# Patient Record
Sex: MALE | Race: WHITE | URBAN - METROPOLITAN AREA
[De-identification: names, ages, dates, MRNs, and addresses within clinical notes are randomized per-mention and may not be internally consistent; named-entity substitution may affect disease eponyms.]

---

## 2017-12-18 ENCOUNTER — OUTPATIENT (OUTPATIENT)
Dept: OUTPATIENT SERVICES | Facility: HOSPITAL | Age: 77
LOS: 1 days | Discharge: HOME | End: 2017-12-18
Payer: MEDICARE

## 2017-12-18 ENCOUNTER — RESULT REVIEW (OUTPATIENT)
Age: 77
End: 2017-12-18

## 2017-12-18 VITALS
WEIGHT: 175.93 LBS | SYSTOLIC BLOOD PRESSURE: 143 MMHG | HEART RATE: 56 BPM | TEMPERATURE: 98 F | RESPIRATION RATE: 14 BRPM | OXYGEN SATURATION: 100 % | HEIGHT: 70.5 IN | DIASTOLIC BLOOD PRESSURE: 98 MMHG

## 2017-12-18 DIAGNOSIS — Z90.89 ACQUIRED ABSENCE OF OTHER ORGANS: Chronic | ICD-10-CM

## 2017-12-18 DIAGNOSIS — Z98.890 OTHER SPECIFIED POSTPROCEDURAL STATES: Chronic | ICD-10-CM

## 2017-12-18 PROCEDURE — 88313 SPECIAL STAINS GROUP 2: CPT | Mod: 26

## 2017-12-18 PROCEDURE — 88305 TISSUE EXAM BY PATHOLOGIST: CPT | Mod: 26

## 2017-12-19 LAB — SURGICAL PATHOLOGY FINAL REPORT - CH: SIGNIFICANT CHANGE UP

## 2017-12-28 DIAGNOSIS — K22.70 BARRETT'S ESOPHAGUS WITHOUT DYSPLASIA: ICD-10-CM

## 2017-12-28 DIAGNOSIS — Z12.11 ENCOUNTER FOR SCREENING FOR MALIGNANT NEOPLASM OF COLON: ICD-10-CM

## 2017-12-28 DIAGNOSIS — Z95.5 PRESENCE OF CORONARY ANGIOPLASTY IMPLANT AND GRAFT: ICD-10-CM

## 2017-12-28 DIAGNOSIS — Z79.02 LONG TERM (CURRENT) USE OF ANTITHROMBOTICS/ANTIPLATELETS: ICD-10-CM

## 2017-12-28 DIAGNOSIS — Z88.5 ALLERGY STATUS TO NARCOTIC AGENT: ICD-10-CM

## 2017-12-28 DIAGNOSIS — K44.9 DIAPHRAGMATIC HERNIA WITHOUT OBSTRUCTION OR GANGRENE: ICD-10-CM

## 2017-12-28 DIAGNOSIS — Z79.82 LONG TERM (CURRENT) USE OF ASPIRIN: ICD-10-CM

## 2017-12-28 DIAGNOSIS — Z88.1 ALLERGY STATUS TO OTHER ANTIBIOTIC AGENTS STATUS: ICD-10-CM

## 2017-12-28 DIAGNOSIS — E78.5 HYPERLIPIDEMIA, UNSPECIFIED: ICD-10-CM

## 2017-12-28 DIAGNOSIS — D12.5 BENIGN NEOPLASM OF SIGMOID COLON: ICD-10-CM

## 2017-12-28 DIAGNOSIS — K64.8 OTHER HEMORRHOIDS: ICD-10-CM

## 2017-12-28 DIAGNOSIS — I25.10 ATHEROSCLEROTIC HEART DISEASE OF NATIVE CORONARY ARTERY WITHOUT ANGINA PECTORIS: ICD-10-CM

## 2018-09-06 PROBLEM — E78.5 HYPERLIPIDEMIA, UNSPECIFIED: Chronic | Status: ACTIVE | Noted: 2017-12-18

## 2018-09-06 PROBLEM — R12 HEARTBURN: Chronic | Status: ACTIVE | Noted: 2017-12-18

## 2018-09-06 PROBLEM — Z95.5 PRESENCE OF CORONARY ANGIOPLASTY IMPLANT AND GRAFT: Chronic | Status: ACTIVE | Noted: 2017-12-18

## 2018-09-06 PROBLEM — I25.10 ATHEROSCLEROTIC HEART DISEASE OF NATIVE CORONARY ARTERY WITHOUT ANGINA PECTORIS: Chronic | Status: ACTIVE | Noted: 2017-12-18

## 2018-09-06 PROBLEM — D12.6 BENIGN NEOPLASM OF COLON, UNSPECIFIED: Chronic | Status: ACTIVE | Noted: 2017-12-18

## 2018-09-10 ENCOUNTER — OUTPATIENT (OUTPATIENT)
Dept: OUTPATIENT SERVICES | Facility: HOSPITAL | Age: 78
LOS: 1 days | End: 2018-09-10
Payer: MEDICARE

## 2018-09-10 DIAGNOSIS — Z90.89 ACQUIRED ABSENCE OF OTHER ORGANS: Chronic | ICD-10-CM

## 2018-09-10 DIAGNOSIS — Z98.890 OTHER SPECIFIED POSTPROCEDURAL STATES: Chronic | ICD-10-CM

## 2018-09-10 DIAGNOSIS — M54.16 RADICULOPATHY, LUMBAR REGION: ICD-10-CM

## 2018-09-10 PROCEDURE — 62323 NJX INTERLAMINAR LMBR/SAC: CPT

## 2018-09-10 PROCEDURE — 77003 FLUOROGUIDE FOR SPINE INJECT: CPT

## 2019-06-23 ENCOUNTER — INPATIENT (INPATIENT)
Facility: HOSPITAL | Age: 79
LOS: 4 days | Discharge: HOME | End: 2019-06-28
Attending: INTERNAL MEDICINE | Admitting: INTERNAL MEDICINE
Payer: MEDICARE

## 2019-06-23 VITALS
TEMPERATURE: 100 F | DIASTOLIC BLOOD PRESSURE: 85 MMHG | SYSTOLIC BLOOD PRESSURE: 114 MMHG | WEIGHT: 176.37 LBS | OXYGEN SATURATION: 94 % | HEART RATE: 100 BPM | RESPIRATION RATE: 18 BRPM

## 2019-06-23 DIAGNOSIS — Z98.890 OTHER SPECIFIED POSTPROCEDURAL STATES: Chronic | ICD-10-CM

## 2019-06-23 DIAGNOSIS — Z90.89 ACQUIRED ABSENCE OF OTHER ORGANS: Chronic | ICD-10-CM

## 2019-06-23 LAB
BASOPHILS # BLD AUTO: 0.03 K/UL — SIGNIFICANT CHANGE UP (ref 0–0.2)
BASOPHILS NFR BLD AUTO: 0.2 % — SIGNIFICANT CHANGE UP (ref 0–2)
EOSINOPHIL # BLD AUTO: 0.06 K/UL — SIGNIFICANT CHANGE UP (ref 0–0.5)
EOSINOPHIL NFR BLD AUTO: 0.4 % — SIGNIFICANT CHANGE UP (ref 0–6)
HCT VFR BLD CALC: 43.2 % — SIGNIFICANT CHANGE UP (ref 39–50)
HGB BLD-MCNC: 14.7 G/DL — SIGNIFICANT CHANGE UP (ref 13–17)
IMM GRANULOCYTES NFR BLD AUTO: 0.5 % — SIGNIFICANT CHANGE UP (ref 0–1.5)
LYMPHOCYTES # BLD AUTO: 0.89 K/UL — LOW (ref 1–3.3)
LYMPHOCYTES # BLD AUTO: 6.6 % — LOW (ref 13–44)
MCHC RBC-ENTMCNC: 31.5 PG — SIGNIFICANT CHANGE UP (ref 27–34)
MCHC RBC-ENTMCNC: 34 GM/DL — SIGNIFICANT CHANGE UP (ref 32–36)
MCV RBC AUTO: 92.5 FL — SIGNIFICANT CHANGE UP (ref 80–100)
MONOCYTES # BLD AUTO: 1.07 K/UL — HIGH (ref 0–0.9)
MONOCYTES NFR BLD AUTO: 7.9 % — SIGNIFICANT CHANGE UP (ref 2–14)
NEUTROPHILS # BLD AUTO: 11.38 K/UL — HIGH (ref 1.8–7.4)
NEUTROPHILS NFR BLD AUTO: 84.4 % — HIGH (ref 43–77)
PLATELET # BLD AUTO: 201 K/UL — SIGNIFICANT CHANGE UP (ref 150–400)
RBC # BLD: 4.67 M/UL — SIGNIFICANT CHANGE UP (ref 4.2–5.8)
RBC # FLD: 13.8 % — SIGNIFICANT CHANGE UP (ref 10.3–14.5)
WBC # BLD: 13.5 K/UL — HIGH (ref 3.8–10.5)
WBC # FLD AUTO: 13.5 K/UL — HIGH (ref 3.8–10.5)

## 2019-06-23 PROCEDURE — 93010 ELECTROCARDIOGRAM REPORT: CPT

## 2019-06-23 PROCEDURE — 99285 EMERGENCY DEPT VISIT HI MDM: CPT

## 2019-06-23 PROCEDURE — 71045 X-RAY EXAM CHEST 1 VIEW: CPT | Mod: 26

## 2019-06-23 RX ORDER — PIPERACILLIN AND TAZOBACTAM 4; .5 G/20ML; G/20ML
3.38 INJECTION, POWDER, LYOPHILIZED, FOR SOLUTION INTRAVENOUS ONCE
Refills: 0 | Status: COMPLETED | OUTPATIENT
Start: 2019-06-23 | End: 2019-06-23

## 2019-06-23 RX ORDER — VANCOMYCIN HCL 1 G
1250 VIAL (EA) INTRAVENOUS ONCE
Refills: 0 | Status: COMPLETED | OUTPATIENT
Start: 2019-06-23 | End: 2019-06-23

## 2019-06-23 RX ORDER — ACETAMINOPHEN 500 MG
975 TABLET ORAL ONCE
Refills: 0 | Status: COMPLETED | OUTPATIENT
Start: 2019-06-23 | End: 2019-06-23

## 2019-06-23 RX ORDER — SODIUM CHLORIDE 9 MG/ML
2500 INJECTION INTRAMUSCULAR; INTRAVENOUS; SUBCUTANEOUS ONCE
Refills: 0 | Status: COMPLETED | OUTPATIENT
Start: 2019-06-23 | End: 2019-06-23

## 2019-06-23 RX ADMIN — PIPERACILLIN AND TAZOBACTAM 200 GRAM(S): 4; .5 INJECTION, POWDER, LYOPHILIZED, FOR SOLUTION INTRAVENOUS at 23:57

## 2019-06-23 RX ADMIN — SODIUM CHLORIDE 2500 MILLILITER(S): 9 INJECTION INTRAMUSCULAR; INTRAVENOUS; SUBCUTANEOUS at 23:45

## 2019-06-23 RX ADMIN — Medication 975 MILLIGRAM(S): at 23:45

## 2019-06-23 NOTE — ED ADULT NURSE NOTE - OBJECTIVE STATEMENT
pt is 79 y..o male who had one episode of abdominal pain this afternoon than drove home from [facility] and had an episode of epigastric pain which was relieved with nitroglycerin x3 q5m, pt also reports fevers.  pt is s/p carpel tunnel surgery with two wounds looking healthy in nature with a small amount serosanguineous drainage.  pt is tender upon palpation of RUQ, confirmed gallstones s/p u//s out patient x 1 week ago, pt febrile rectally 101.8.

## 2019-06-23 NOTE — ED ADULT NURSE NOTE - PMH
CAD (coronary artery disease)    Colon adenoma    Heartburn    Hyperlipemia    S/P coronary artery stent placement  lad x2

## 2019-06-23 NOTE — ED ADULT NURSE NOTE - BREATH SOUNDS, MLM
Progress Note    Patient: Chago Cottrell Date: 3/20/2018   male, 48 year old  Admit Date: 3/18/2018   Attending: Brendon Garcia MD      Subjective:  Chago Cottrell is a 48 year old male who is being seen in follow up for Colonic mass.    No acute overnight events  Denies abdominal pain, nausea ,or vomiting. No diarrhea  Tmax- 100.2F           Medications: personally reviewed today in this patient's active orders section of epic  Allergies:   Allergies as of 03/18/2018   • (No Known Allergies)       PHYSICAL EXAM:  Visit Vitals  /84 (BP Location: [facility], Patient Position: Semi-Go's)   Pulse 94   Temp 97.9 °F (36.6 °C) (Oral)   Resp 16   Ht 5' 9\" (1.753 m)   Wt 89.9 kg   SpO2 93%   BMI 29.27 kg/m²     General: A & O X 3 in no acute distress, normal cephalic/atraumatic, Mood and Affect appropriate  Lungs: Clear to auscultation bilaterally  Heart:  Regular rate and rhythm and S1, S2 present  Abdomen: NT/ND;  + B.S.; No guarding or rebound; No peritoneal signs  Extremities: cyanosis absent; no obvious lesions or wounds.  Neurologic:  CN 2-12 Grossly intact as best as patient can cooperate with exam     strength is bilaterally intact in all 4 extremities , sensation is grossly intact throughout    Labs:    Recent Labs  Lab 03/20/18  0602 03/19/18  0615 03/18/18  1050   WBC 11.1* 11.9* 18.9*   RBC 2.87* 2.73* 3.11*   HGB 7.9* 7.7* 9.0*   HCT 24.6* 23.3* 26.9*    280 372   SEG 83 84 86       Recent Labs  Lab 03/20/18  0602 03/19/18  0615 03/18/18  1050   SODIUM 138 134* 135   POTASSIUM 3.8 3.7 4.8   CHLORIDE 105 101 100   CO2 23 24 27   BUN 5* 6 8   CREATININE 0.55* 0.60* 0.52*   GFRNA >90 >90 >90   GLUCOSE 129* 112* 105*   CALCIUM 7.6* 7.8* 9.0   ALBUMIN  --  1.4* 1.7*   AST  --  44* 50*   GPT  --  36 38   BILIRUBIN  --  1.1* 1.3*   ALKPT  --  319* 345*   INR  --  1.3 1.2       Assessment & Plan:     · Acute portal vein thrombosis- unknown etiology. c/w heparin probably may start oral AC  tomorrow.. GI following     · Colon thickening with liver metastasis- less concern for colon cancer following colonoscopy. Probably walled off abscess with septic embolies to the liver. s/p EGD/colonoscopy with biopsy and liver FNA.. CEA- < 0.5.   · Blood cxs- NGTD     · Obstructive jaundice: likely due to liver mets possible from septic emboli. Monitor LFTs. s/p FNA of liver  · SIRS- probably due to malignancy or colitis, inflammatory markers are elevated. Lactic acid is normal, pct- elevated. Leucocytosis, c/w zosyn bld cx- NGTD.     · Alcohol dependence- pt has stopped drinking since December last year     · DVT Prophylaxis-SCDs, TEDs and IV Heparin    Code status: Full Resuscitation    Disposition: guarded    Brendon Garcia MD  Hospitalist  3/20/2018  1:15 PM           Clear

## 2019-06-24 DIAGNOSIS — I25.10 ATHEROSCLEROTIC HEART DISEASE OF NATIVE CORONARY ARTERY WITHOUT ANGINA PECTORIS: ICD-10-CM

## 2019-06-24 DIAGNOSIS — J18.1 LOBAR PNEUMONIA, UNSPECIFIED ORGANISM: ICD-10-CM

## 2019-06-24 DIAGNOSIS — R10.13 EPIGASTRIC PAIN: ICD-10-CM

## 2019-06-24 DIAGNOSIS — R07.9 CHEST PAIN, UNSPECIFIED: ICD-10-CM

## 2019-06-24 LAB
ALBUMIN SERPL ELPH-MCNC: 3 G/DL — LOW (ref 3.3–5)
ALBUMIN SERPL ELPH-MCNC: 3.2 G/DL — LOW (ref 3.3–5)
ALP SERPL-CCNC: 68 U/L — SIGNIFICANT CHANGE UP (ref 40–120)
ALP SERPL-CCNC: 70 U/L — SIGNIFICANT CHANGE UP (ref 40–120)
ALT FLD-CCNC: 25 U/L — SIGNIFICANT CHANGE UP (ref 12–78)
ALT FLD-CCNC: 27 U/L — SIGNIFICANT CHANGE UP (ref 12–78)
AMYLASE P1 CFR SERPL: 48 U/L — SIGNIFICANT CHANGE UP (ref 25–115)
ANION GAP SERPL CALC-SCNC: 10 MMOL/L — SIGNIFICANT CHANGE UP (ref 5–17)
ANION GAP SERPL CALC-SCNC: 7 MMOL/L — SIGNIFICANT CHANGE UP (ref 5–17)
APPEARANCE UR: CLEAR — SIGNIFICANT CHANGE UP
APTT BLD: 30.9 SEC — SIGNIFICANT CHANGE UP (ref 27.5–36.3)
AST SERPL-CCNC: 19 U/L — SIGNIFICANT CHANGE UP (ref 15–37)
AST SERPL-CCNC: 20 U/L — SIGNIFICANT CHANGE UP (ref 15–37)
BILIRUB SERPL-MCNC: 0.7 MG/DL — SIGNIFICANT CHANGE UP (ref 0.2–1.2)
BILIRUB SERPL-MCNC: 0.8 MG/DL — SIGNIFICANT CHANGE UP (ref 0.2–1.2)
BILIRUB UR-MCNC: NEGATIVE — SIGNIFICANT CHANGE UP
BUN SERPL-MCNC: 14 MG/DL — SIGNIFICANT CHANGE UP (ref 7–23)
BUN SERPL-MCNC: 18 MG/DL — SIGNIFICANT CHANGE UP (ref 7–23)
CALCIUM SERPL-MCNC: 8.3 MG/DL — LOW (ref 8.5–10.1)
CALCIUM SERPL-MCNC: 8.4 MG/DL — LOW (ref 8.5–10.1)
CHLORIDE SERPL-SCNC: 102 MMOL/L — SIGNIFICANT CHANGE UP (ref 96–108)
CHLORIDE SERPL-SCNC: 107 MMOL/L — SIGNIFICANT CHANGE UP (ref 96–108)
CO2 SERPL-SCNC: 25 MMOL/L — SIGNIFICANT CHANGE UP (ref 22–31)
CO2 SERPL-SCNC: 27 MMOL/L — SIGNIFICANT CHANGE UP (ref 22–31)
COLOR SPEC: YELLOW — SIGNIFICANT CHANGE UP
CREAT SERPL-MCNC: 1.08 MG/DL — SIGNIFICANT CHANGE UP (ref 0.5–1.3)
CREAT SERPL-MCNC: 1.13 MG/DL — SIGNIFICANT CHANGE UP (ref 0.5–1.3)
DIFF PNL FLD: NEGATIVE — SIGNIFICANT CHANGE UP
GLUCOSE SERPL-MCNC: 115 MG/DL — HIGH (ref 70–99)
GLUCOSE SERPL-MCNC: 72 MG/DL — SIGNIFICANT CHANGE UP (ref 70–99)
GLUCOSE UR QL: NEGATIVE MG/DL — SIGNIFICANT CHANGE UP
HCT VFR BLD CALC: 43.8 % — SIGNIFICANT CHANGE UP (ref 39–50)
HGB BLD-MCNC: 14.5 G/DL — SIGNIFICANT CHANGE UP (ref 13–17)
INR BLD: 1.09 RATIO — SIGNIFICANT CHANGE UP (ref 0.88–1.16)
KETONES UR-MCNC: NEGATIVE — SIGNIFICANT CHANGE UP
LACTATE SERPL-SCNC: 0.9 MMOL/L — SIGNIFICANT CHANGE UP (ref 0.7–2)
LEUKOCYTE ESTERASE UR-ACNC: NEGATIVE — SIGNIFICANT CHANGE UP
LIDOCAIN IGE QN: 168 U/L — SIGNIFICANT CHANGE UP (ref 73–393)
MCHC RBC-ENTMCNC: 31.5 PG — SIGNIFICANT CHANGE UP (ref 27–34)
MCHC RBC-ENTMCNC: 33.1 GM/DL — SIGNIFICANT CHANGE UP (ref 32–36)
MCV RBC AUTO: 95 FL — SIGNIFICANT CHANGE UP (ref 80–100)
NITRITE UR-MCNC: NEGATIVE — SIGNIFICANT CHANGE UP
PH UR: 6.5 — SIGNIFICANT CHANGE UP (ref 5–8)
PLATELET # BLD AUTO: 184 K/UL — SIGNIFICANT CHANGE UP (ref 150–400)
POTASSIUM SERPL-MCNC: 4.1 MMOL/L — SIGNIFICANT CHANGE UP (ref 3.5–5.3)
POTASSIUM SERPL-MCNC: 4.2 MMOL/L — SIGNIFICANT CHANGE UP (ref 3.5–5.3)
POTASSIUM SERPL-SCNC: 4.1 MMOL/L — SIGNIFICANT CHANGE UP (ref 3.5–5.3)
POTASSIUM SERPL-SCNC: 4.2 MMOL/L — SIGNIFICANT CHANGE UP (ref 3.5–5.3)
PROT SERPL-MCNC: 6.7 GM/DL — SIGNIFICANT CHANGE UP (ref 6–8.3)
PROT SERPL-MCNC: 6.7 GM/DL — SIGNIFICANT CHANGE UP (ref 6–8.3)
PROT UR-MCNC: NEGATIVE MG/DL — SIGNIFICANT CHANGE UP
PROTHROM AB SERPL-ACNC: 12.1 SEC — SIGNIFICANT CHANGE UP (ref 10–12.9)
RBC # BLD: 4.61 M/UL — SIGNIFICANT CHANGE UP (ref 4.2–5.8)
RBC # FLD: 14.1 % — SIGNIFICANT CHANGE UP (ref 10.3–14.5)
SODIUM SERPL-SCNC: 137 MMOL/L — SIGNIFICANT CHANGE UP (ref 135–145)
SODIUM SERPL-SCNC: 141 MMOL/L — SIGNIFICANT CHANGE UP (ref 135–145)
SP GR SPEC: 1 — LOW (ref 1.01–1.02)
TROPONIN I SERPL-MCNC: 0.12 NG/ML — HIGH (ref 0.01–0.04)
TROPONIN I SERPL-MCNC: 0.16 NG/ML — HIGH (ref 0.01–0.04)
TROPONIN I SERPL-MCNC: 0.17 NG/ML — HIGH (ref 0.01–0.04)
UROBILINOGEN FLD QL: NEGATIVE MG/DL — SIGNIFICANT CHANGE UP
WBC # BLD: 11.2 K/UL — HIGH (ref 3.8–10.5)
WBC # FLD AUTO: 11.2 K/UL — HIGH (ref 3.8–10.5)

## 2019-06-24 PROCEDURE — 71260 CT THORAX DX C+: CPT | Mod: 26

## 2019-06-24 PROCEDURE — 76705 ECHO EXAM OF ABDOMEN: CPT | Mod: 26

## 2019-06-24 PROCEDURE — 74177 CT ABD & PELVIS W/CONTRAST: CPT | Mod: 26

## 2019-06-24 RX ORDER — ASPIRIN/CALCIUM CARB/MAGNESIUM 324 MG
325 TABLET ORAL DAILY
Refills: 0 | Status: DISCONTINUED | OUTPATIENT
Start: 2019-06-24 | End: 2019-06-28

## 2019-06-24 RX ORDER — ONDANSETRON 8 MG/1
4 TABLET, FILM COATED ORAL EVERY 6 HOURS
Refills: 0 | Status: DISCONTINUED | OUTPATIENT
Start: 2019-06-24 | End: 2019-06-28

## 2019-06-24 RX ORDER — LANOLIN ALCOHOL/MO/W.PET/CERES
5 CREAM (GRAM) TOPICAL AT BEDTIME
Refills: 0 | Status: DISCONTINUED | OUTPATIENT
Start: 2019-06-24 | End: 2019-06-28

## 2019-06-24 RX ORDER — PANTOPRAZOLE SODIUM 20 MG/1
40 TABLET, DELAYED RELEASE ORAL
Refills: 0 | Status: DISCONTINUED | OUTPATIENT
Start: 2019-06-24 | End: 2019-06-28

## 2019-06-24 RX ORDER — NITROGLYCERIN 6.5 MG
0.4 CAPSULE, EXTENDED RELEASE ORAL
Refills: 0 | Status: DISCONTINUED | OUTPATIENT
Start: 2019-06-24 | End: 2019-06-28

## 2019-06-24 RX ORDER — MIRTAZAPINE 45 MG/1
15 TABLET, ORALLY DISINTEGRATING ORAL AT BEDTIME
Refills: 0 | Status: DISCONTINUED | OUTPATIENT
Start: 2019-06-24 | End: 2019-06-28

## 2019-06-24 RX ORDER — MIRTAZAPINE 45 MG/1
1 TABLET, ORALLY DISINTEGRATING ORAL
Qty: 0 | Refills: 0 | DISCHARGE

## 2019-06-24 RX ORDER — ATORVASTATIN CALCIUM 80 MG/1
80 TABLET, FILM COATED ORAL AT BEDTIME
Refills: 0 | Status: DISCONTINUED | OUTPATIENT
Start: 2019-06-24 | End: 2019-06-28

## 2019-06-24 RX ORDER — OMEPRAZOLE 10 MG/1
1 CAPSULE, DELAYED RELEASE ORAL
Qty: 0 | Refills: 0 | DISCHARGE

## 2019-06-24 RX ORDER — ACETAMINOPHEN 500 MG
650 TABLET ORAL ONCE
Refills: 0 | Status: DISCONTINUED | OUTPATIENT
Start: 2019-06-24 | End: 2019-06-28

## 2019-06-24 RX ORDER — PIPERACILLIN AND TAZOBACTAM 4; .5 G/20ML; G/20ML
3.38 INJECTION, POWDER, LYOPHILIZED, FOR SOLUTION INTRAVENOUS EVERY 8 HOURS
Refills: 0 | Status: DISCONTINUED | OUTPATIENT
Start: 2019-06-24 | End: 2019-06-27

## 2019-06-24 RX ORDER — OXYBUTYNIN CHLORIDE 5 MG
1 TABLET ORAL
Qty: 0 | Refills: 0 | DISCHARGE

## 2019-06-24 RX ORDER — LANOLIN ALCOHOL/MO/W.PET/CERES
0 CREAM (GRAM) TOPICAL
Qty: 0 | Refills: 0 | DISCHARGE

## 2019-06-24 RX ORDER — ASPIRIN/CALCIUM CARB/MAGNESIUM 324 MG
325 TABLET ORAL ONCE
Refills: 0 | Status: COMPLETED | OUTPATIENT
Start: 2019-06-24 | End: 2019-06-24

## 2019-06-24 RX ORDER — VANCOMYCIN HCL 1 G
1000 VIAL (EA) INTRAVENOUS EVERY 12 HOURS
Refills: 0 | Status: DISCONTINUED | OUTPATIENT
Start: 2019-06-24 | End: 2019-06-27

## 2019-06-24 RX ADMIN — Medication 325 MILLIGRAM(S): at 05:30

## 2019-06-24 RX ADMIN — Medication 250 MILLIGRAM(S): at 20:25

## 2019-06-24 RX ADMIN — Medication 166.67 MILLIGRAM(S): at 01:12

## 2019-06-24 RX ADMIN — Medication 5 MILLIGRAM(S): at 22:13

## 2019-06-24 RX ADMIN — PIPERACILLIN AND TAZOBACTAM 25 GRAM(S): 4; .5 INJECTION, POWDER, LYOPHILIZED, FOR SOLUTION INTRAVENOUS at 22:13

## 2019-06-24 RX ADMIN — PIPERACILLIN AND TAZOBACTAM 3.38 GRAM(S): 4; .5 INJECTION, POWDER, LYOPHILIZED, FOR SOLUTION INTRAVENOUS at 01:01

## 2019-06-24 RX ADMIN — SODIUM CHLORIDE 2500 MILLILITER(S): 9 INJECTION INTRAMUSCULAR; INTRAVENOUS; SUBCUTANEOUS at 05:52

## 2019-06-24 RX ADMIN — Medication 1250 MILLIGRAM(S): at 05:52

## 2019-06-24 RX ADMIN — PIPERACILLIN AND TAZOBACTAM 25 GRAM(S): 4; .5 INJECTION, POWDER, LYOPHILIZED, FOR SOLUTION INTRAVENOUS at 15:47

## 2019-06-24 RX ADMIN — ATORVASTATIN CALCIUM 80 MILLIGRAM(S): 80 TABLET, FILM COATED ORAL at 22:13

## 2019-06-24 RX ADMIN — Medication 975 MILLIGRAM(S): at 01:12

## 2019-06-24 RX ADMIN — Medication 0.4 MILLIGRAM(S): at 12:53

## 2019-06-24 RX ADMIN — MIRTAZAPINE 15 MILLIGRAM(S): 45 TABLET, ORALLY DISINTEGRATING ORAL at 22:45

## 2019-06-24 NOTE — ED PROVIDER NOTE - OBJECTIVE STATEMENT
80 yo male with h/o CAD, HLD, and recent surgery on his right elbow c/o abdominal pain.  Patient has had dull abdominal pain for the day with decreased appetite and some nausea.  Had chills tonight, fever 101.8 in ED.  Patient had chest pain about 2 weeks ago and was sent for US by Dr Mazariegos and found to have gallstones.  No cough.  No URI symptoms.  Dr Leblanc following the arm wound, had some dehiscence at the elbow.

## 2019-06-24 NOTE — H&P ADULT - ASSESSMENT
79 year old man with history of CAD- stents x2 on aspirin, HLD- recent surgery for carpal tunnel on the right hand and Elbow surgery with Dr Leblanc who presented to the ED with complaint of chest pain and abdominal pain. CP has been on and off for the past two weeks, he saw Dr Mazariegos- cardiac workup was done which was "ok" as per patient. He was prescribed Nitro PRN. Yesterday after lunch he started having epigastric abdominal pain and chills, denies fever. He had a recent abdominal sonogram at his PCP office and was told he has  gallstones. In the ED patient had a fever of 101.8 with leukocytosis. Chest CT showed RUL pnuemonia- patient was started on Vanco and zosyn. + elevated troponin- cardiology consulted.

## 2019-06-24 NOTE — H&P ADULT - PROBLEM SELECTOR PLAN 2
HCAP -concern for GNR and other resistant bacteria  on Zosyn and Vanco  ID consult  - monitor tmeps  monitor s/e of antibiotics  f/u blood culture HCAP -concern for GNR and other resistant bacteria  on Zosyn and Vanco-  continue  ID consult pending  - monitor tmeps  monitor s/e of antibiotics  f/u blood culture

## 2019-06-24 NOTE — H&P ADULT - NSHPPHYSICALEXAM_GEN_ALL_CORE
Vital Signs Last 24 Hrs  T(C): 36.2 (24 Jun 2019 09:45), Max: 38.8 (23 Jun 2019 23:45)  T(F): 97.2 (24 Jun 2019 09:45), Max: 101.8 (23 Jun 2019 23:45)  HR: 61 (24 Jun 2019 09:45) (49 - 100)  BP: 122/75 (24 Jun 2019 09:45) (95/65 - 128/74)  BP(mean): --  RR: 18 (24 Jun 2019 09:45) (16 - 20)  SpO2: 99% (24 Jun 2019 09:45) (89% - 99%)    PE:  Constitutional: NAD, laying in bed  HEENT: NC/AT  Back: no tenderness  Respiratory: respirations even and non labored, LCTA  Cardiovascular: S1S2 regular, no murmurs  Abdomen: TTP on epigastric area, distended, hypoactive BS  Genitourinary: voiding  Rectal: deferred  Musculoskeletal: no muscle tenderness, no joint swelling or tenderness  Extremities: Right elbow ace wrap intact. R arm dressign in place  Neurological: no focal deficits

## 2019-06-24 NOTE — PROVIDER CONTACT NOTE (OTHER) - SITUATION
Spoke with Dr. Otero's office. Dr. Pratt will be taking Dr. Otero's consults this week.     Dr. Pratt's office is aware of consult.

## 2019-06-24 NOTE — H&P ADULT - PROBLEM SELECTOR PLAN 4
continue aspirin  cards consult  IMPROVE VTE Individual Risk Assessment  RISK                                                                Points  [  ]Previous VTE                                                  3  [  ] Thrombophilia                                               2  [  ] Lower limb paralysis                                      2        (unable to hold up >15 seconds)    [  ] Current Cancer                                              2         (within 6 months)  [  ] Immobilization > 24 hrs                                1  [  ] ICU/CCU stay > 24 hours                              1    [ x ] Age > 60                                                      1  IMPROVE VTE Score _____1___  IMPROVE Score 0-1: Low Risk, No VTE prophylaxis required for most patients, encourage ambulation.   IMPROVE Score 2-3: At risk, pharmacologic VTE prophylaxis is indicated for most patients (in the absence of a contraindication)  IMPROVE Score > or = 4: High Risk, pharmacologic VTE prophylaxis is indicated for most patients (in the absence of a contraindication)

## 2019-06-24 NOTE — ED PROVIDER NOTE - SKIN, MLM
Skin normal color for race, warm, dry ; sutured lac on right hand, c/d/i; wound to elbow open, with serosanguinous drainage, but no erythema surrounding wound. edges clean.

## 2019-06-24 NOTE — CONSULT NOTE ADULT - ASSESSMENT
79 year old man with history of CAD- stents x2 on aspirin, HLD- recent surgery for carpal tunnel on the right hand and Elbow surgery with Dr Leblanc admitted on 6/23 for evaluation of chest pain and mid epigastric pain which has been present for last 2 weeks; patient took nitroglycerin prior to coming to ED but symptoms persisted; upon admission the patient was febrile to 101.8 and had imaging showing pneumonia. He denies shortness of breath or coughing. No sick contacts or recent travel. No other specific complaints. His mid epigastric pain is worse after eating.   1. Patient admitted with pneumonia which will treat as health care associated pneumonia given recent surgery  - patient at risk for gram negative rods and other resistant bacteria   - iv hydration and supportive care   - serial cbc and monitor temperature   - reviewed prior medical records to evaluate for resistant or atypical pathogens   - agree with zosyn as ordered  - will add vancomycin to treat resistant bacteria   - check vancomycin trough prior to fourth dose   2. other issues; per medicine 79 year old man with history of CAD- stents x2 on aspirin, HLD- recent surgery for carpal tunnel on the right hand and Elbow surgery with Dr Leblanc admitted on 6/23 for evaluation of chest pain and mid epigastric pain which has been present for last 2 weeks; patient took nitroglycerin prior to coming to ED but symptoms persisted; upon admission the patient was febrile to 101.8 and had imaging showing pneumonia. He denies shortness of breath or coughing. No sick contacts or recent travel. No other specific complaints. His mid epigastric pain is worse after eating.   1. Patient admitted with pneumonia which will treat as health care associated pneumonia given recent surgery; also noted with leukocytosis most likely reactive to infection  - patient at risk for gram negative rods and other resistant bacteria   - iv hydration and supportive care   - serial cbc and monitor temperature   - reviewed prior medical records to evaluate for resistant or atypical pathogens   - agree with zosyn as ordered  - will add vancomycin to treat resistant bacteria   - check vancomycin trough prior to fourth dose   2. other issues; per medicine

## 2019-06-24 NOTE — H&P ADULT - ATTENDING COMMENTS
Patient is seen and examined at bedside. Chart reviewed. Agree with above assessment and plan. Patient updated on plan of care and in agreement.

## 2019-06-24 NOTE — PROVIDER CONTACT NOTE (OTHER) - SITUATION
Per Dr. Nuñez contact Dr. Mazariegos for consult - this is his regular patient.  Left message with service

## 2019-06-24 NOTE — CONSULT NOTE ADULT - SUBJECTIVE AND OBJECTIVE BOX
Patient is a 79y old  Male who presents with a chief complaint of abdominal pain and chest pain (2019 09:36)    HPI:  79 year old man with history of CAD- stents x2 on aspirin, HLD- recent surgery for carpal tunnel on the right hand and Elbow surgery with Dr Leblanc admitted on  for evaluation of chest pain and mid epigastric pain which has been present for last 2 weeks; patient took nitroglycerin prior to coming to ED but symptoms persisted; upon admission the patient was febrile to 101.8 and had imaging showing pneumonia. He denies shortness of breath or coughing. No sick contacts or recent travel. No other specific complaints. His mid epigastric pain is worse after eating.            PMH: as above  PSH: as above  Meds: per reconciliation sheet, noted below  MEDICATIONS  (STANDING):  aspirin 325 milliGRAM(s) Oral daily  atorvastatin 80 milliGRAM(s) Oral at bedtime  melatonin 5 milliGRAM(s) Oral at bedtime  mirtazapine 15 milliGRAM(s) Oral at bedtime  pantoprazole    Tablet 40 milliGRAM(s) Oral before breakfast  piperacillin/tazobactam IVPB.. 3.375 Gram(s) IV Intermittent every 8 hours  vancomycin  IVPB 1000 milliGRAM(s) IV Intermittent every 12 hours    MEDICATIONS  (PRN):  nitroglycerin     SubLingual 0.4 milliGRAM(s) SubLingual every 5 minutes PRN Chest Pain  ondansetron Injectable 4 milliGRAM(s) IV Push every 6 hours PRN Nausea    Allergies    clindamycin (Other)  morphine (Other)    Intolerances      Social: no smoking,  alcohol, no illegal drugs; no recent travel, no exposure to TB  FAMILY HISTORY:     no history of premature cardiovascular disease in first degree relatives  ROS: the patient has no chills, no HA, no dizziness, no sore throat, no blurry vision, no palpitations, no SOB, no cough,  no diarrhea, no N/V, no dysuria, no leg pain, no claudication, no rash, no joint aches, no rectal pain or bleeding, no night sweats  All other systems reviewed and are negative    Vital Signs Last 24 Hrs  T(C): 36.8 (2019 12:53), Max: 38.8 (2019 23:45)  T(F): 98.2 (2019 12:53), Max: 101.8 (2019 23:45)  HR: 63 (2019 12:53) (49 - 100)  BP: 132/73 (2019 12:53) (95/65 - 132/73)  BP(mean): --  RR: 18 (2019 12:53) (16 - 20)  SpO2: 98% (2019 12:53) (89% - 99%)  Daily     Daily Weight in k.1 (2019 10:47)    PE:    Constitutional: frail looking  HEENT: NC/AT, EOMI, PERRLA, conjunctivae clear; ears and nose atraumatic; pharynx clear  Neck: supple; thyroid not palpable  Back: no tenderness  Respiratory: respiratory effort normal; clear to auscultation  Cardiovascular: S1S2 regular, no murmurs  Abdomen: soft, not tender, not distended, positive BS; no liver or spleen organomegaly  Genitourinary: no suprapubic tenderness  Musculoskeletal: no muscle tenderness, no joint swelling or tenderness  Neurological/ Psychiatric: AxOx3, judgement and insight normal;  moving all extremities  Skin: no rashes; no palpable lesions    Labs: all available labs reviewed                        14.5   11.20 )-----------( 184      ( 2019 10:53 )             43.8     06-24    141  |  107  |  14  ----------------------------<  72  4.2   |  27  |  1.08    Ca    8.3<L>      2019 10:53    TPro  6.7  /  Alb  3.0<L>  /  TBili  0.8  /  DBili  x   /  AST  20  /  ALT  25  /  AlkPhos  68  06-24     LIVER FUNCTIONS - ( 2019 10:53 )  Alb: 3.0 g/dL / Pro: 6.7 gm/dL / ALK PHOS: 68 U/L / ALT: 25 U/L / AST: 20 U/L / GGT: x           Urinalysis Basic - ( 2019 02:01 )    Color: Yellow / Appearance: Clear / S.005 / pH: x  Gluc: x / Ketone: Negative  / Bili: Negative / Urobili: Negative mg/dL   Blood: x / Protein: Negative mg/dL / Nitrite: Negative   Leuk Esterase: Negative / RBC: x / WBC x   Sq Epi: x / Non Sq Epi: x / Bacteria: x      < from: CT Chest w/ IV Cont (19 @ 02:36) >    EXAM:  CT CHEST IC                          EXAM:  CT ABDOMEN AND PELVIS IC                            PROCEDURE DATE:  2019          INTERPRETATION:  CLINICAL INFORMATION: Fever, hypoxia and abdominal pain.    TECHNIQUE: Contrast-enhanced axial images of the chest, abdomen and   pelvis were obtained with coronal and sagittal reformats. 90 cc Omnipaque   350 were administered intravenously and 10 cc were discarded.    COMPARISON: CT angiogram chest and abdomen 2013.    FINDINGS:   CHEST:  Lungs and airways: The trachea and main bronchi are patent.  Patchy   opacity in the right upper lobe. Dependent bilateral lower lobe   atelectasis. There is no pleural effusion or pneumothorax.     Heart and vessels: The heart size is normal. Coronary artery   calcifications. There is no pericardial effusion. The thoracic aorta and   main pulmonary artery are normal caliber.     Mediastinum and soft tissues: The thyroid gland is unremarkable. There is   no axillary, mediastinal or hilar adenopathy.     Bones: No acute abnormality. Spinal degenerative changes and posterior   thoracolumbar spinal fixation with bilateral transpedicular screws and   interconnecting rods in T12.    ABDOMEN AND PELVIS:  Hepatobiliary: Tiny gallstones in the gallbladder. Mural nodularity at   the fundus consistent with adenomyomatosis. No CT signs of cholecystitis.   No biliary ductal dilation. Liver within normal limits.  Pancreas: Within normal limits.  Spleen: Within normal limits.  Adrenals: Within normal limits.    Kidneys/Ureters/Bladder: Symmetric nephrographic phase of renal   enhancement. Bilateral extrarenal pelves and left renal peripelvic cysts.   No hydronephrosis. Distended urinary bladder with secondary fullness of   the renal collectingsystem bilaterally.  Reproductive Organs: Mild prostatomegaly measuring 5 x 4 x 5.6 cm with   nodular indentation of the bladder base.    Bowel/Peritoneum: No bowel obstruction, inflammation or pneumoperitoneum.    Visualized appendiceal base is normal.  Portions of the gastrointestinal   tract are collapsed, limiting evaluation.    Vessels:  Mild calcific atherosclerosis. No abdominal aortic aneurysm.  Lymphatics/Retroperitoneum: No lymphadenopathy. No retroperitoneal   hematoma.      Soft Tissues: Within normal limits.  Bones: Generalized osteopenia limits evaluation of the lumbar spine.   Transitional lumbosacral anatomy with apparent lumbarized S1. Possible   fusion of the mid lumbar vertebral bodies. Laminectomy and posterior   spinal fusion with bilateral transpedicular screws and interconnecting   rods from T12 to S1. Hardware appears intact without periprosthetic   lucency. Streak artifact from the hardware limits evaluation of the   surrounding structures.  Destructive L5-S1 endplate changes with metallic   intervertebral disc space density, possibly a disc spacer, probably   chronic as presence of vacuum disc change makes acute discitis less   likely.    IMPRESSION:  CT Chest: Right upper lobe opacity which is nonspecific and may reflect   pneumonia or other infiltrate. Follow-up chest CT in 6 weeks recommended   to ensure resolution.    CT Abdomen and Pelvis: Distended urinary bladder with secondary fullness   of the renal collecting system bilaterally. No discernible acute bowel   inflammation.    Additional incidental findings as described in the body of the report.        < end of copied text >      Radiology: all available radiological tests reviewed    Advanced directives addressed: full resuscitation
CHIEF COMPLAINT:    HPI:  79 year old man with history of CAD- stents x2 on aspirin, HLD- recent surgery for carpal tunnel on the right hand and Elbow surgery with Dr Leblanc who presented to the ED with complaint of chest pain and abdominal pain. CP has been on and off for the past two weeks, he saw Dr Mazariegos- cardiac workup was done which was "ok" as per patient. He was prescribed Nitro PRN. Yesterday after lunch he started having epigastric abdominal pain and chills, denies fever. He had a recent abdominal sonogram at his PCP office and was told he has  gallstones. In the ED patient had a fever of 101.8 with leukocytosis. Chest CT showed RUL pnuemonia- patient was started on Vanco and zosyn. + elevated troponin. Pt had a coronary angiogram in Feb 2019 that showed normal coronary arteries and patent stents. He has had chest pain last week but on admission had epigastric pain and on exam epigastric tenderness. EKG showed sinus rhythm with PAC's. Trop elevated x3 but flat. LFT's normal x2. Amylase and lipase normal. Discussed getting HIDA scan with Dr. Pena and NP.    PMH- carpal tunnel syndrome  CAD, HLD  Heartburn  PSH-CAD with stents  Carpal tunnel surgery x2  elbow surgery  back surgery  FMH- heart disease  Father  Stroke- Mother  Social- does not smoke- drinks on occasions (24 Jun 2019 09:36)      PAST MEDICAL & SURGICAL HISTORY:  S/P coronary artery stent placement: lad x2  Hyperlipemia  Heartburn  Colon adenoma  CAD (coronary artery disease)  Status post tonsillectomy  History of repair of right rotator cuff  History of lumbar laminectomy      Allergies    clindamycin (Other)  morphine (Other)    Intolerances        Occupation:  [employer]: Denied  Smoking: Denied  Drug Use: Denied  Marital Status:         FAMILY HISTORY:      REVIEW OF SYSTEMS:    CONSTITUTIONAL: No weakness, fevers or chills  EYES/ENT: No visual changes;  No vertigo or throat pain   NECK: No pain or stiffness  RESPIRATORY: No cough, wheezing, hemoptysis; No shortness of breath  CARDIOVASCULAR: No chest pain or palpitations  GASTROINTESTINAL: No abdominal or epigastric pain. No nausea, vomiting, or hematemesis; No diarrhea or constipation. No melena or hematochezia.  GENITOURINARY: No dysuria, frequency or hematuria  NEUROLOGICAL: No numbness or weakness  SKIN: No itching, burning, rashes, or lesions   All other review of systems is negative unless indicated above    Vital Signs Last 24 Hrs  T(C): 36.8 (24 Jun 2019 12:53), Max: 38.8 (23 Jun 2019 23:45)  T(F): 98.2 (24 Jun 2019 12:53), Max: 101.8 (23 Jun 2019 23:45)  HR: 63 (24 Jun 2019 12:53) (49 - 100)  BP: 132/73 (24 Jun 2019 12:53) (95/65 - 132/73)  BP(mean): --  RR: 18 (24 Jun 2019 12:53) (16 - 20)  SpO2: 98% (24 Jun 2019 12:53) (89% - 99%)    I&O's Summary      PHYSICAL EXAM:    Constitutional: NAD, awake and alert, well-developed  HEENT: PERR, EOMI,  No oral cyananosis.  Neck:  supple,  No JVD  Respiratory: Breath sounds are clear bilaterally, No wheezing, rales or rhonchi  Cardiovascular: S1 and S2, regular rate and rhythm, no Murmurs, gallops or rubs  Gastrointestinal: Bowel Sounds present, soft, nontender.   Extremities: No peripheral edema. No clubbing or cyanosis.  Vascular: 2+ peripheral pulses  Neurological: A/O x 3, no focal deficits  Musculoskeletal: no calf tenderness.  Skin: No rashes.    MEDICATIONS:  MEDICATIONS  (STANDING):  aspirin 325 milliGRAM(s) Oral daily  atorvastatin 80 milliGRAM(s) Oral at bedtime  melatonin 5 milliGRAM(s) Oral at bedtime  mirtazapine 15 milliGRAM(s) Oral at bedtime  pantoprazole    Tablet 40 milliGRAM(s) Oral before breakfast  piperacillin/tazobactam IVPB.. 3.375 Gram(s) IV Intermittent every 8 hours  vancomycin  IVPB 1000 milliGRAM(s) IV Intermittent every 12 hours      LABS: All Labs Reviewed:                        14.5   11.20 )-----------( 184      ( 24 Jun 2019 10:53 )             43.8                         14.7   13.50 )-----------( 201      ( 23 Jun 2019 23:32 )             43.2     24 Jun 2019 10:53    141    |  107    |  14     ----------------------------<  72     4.2     |  27     |  1.08   23 Jun 2019 23:32    137    |  102    |  18     ----------------------------<  115    4.1     |  25     |  1.13     Ca    8.3        24 Jun 2019 10:53  Ca    8.4        23 Jun 2019 23:32    TPro  6.7    /  Alb  3.0    /  TBili  0.8    /  DBili  x      /  AST  20     /  ALT  25     /  AlkPhos  68     24 Jun 2019 10:53  TPro  6.7    /  Alb  3.2    /  TBili  0.7    /  DBili  x      /  AST  19     /  ALT  27     /  AlkPhos  70     23 Jun 2019 23:32    PT/INR - ( 23 Jun 2019 23:32 )   PT: 12.1 sec;   INR: 1.09 ratio         PTT - ( 23 Jun 2019 23:32 )  PTT:30.9 sec  CARDIAC MARKERS ( 24 Jun 2019 10:53 )  0.124 ng/mL / x     / x     / x     / x      CARDIAC MARKERS ( 24 Jun 2019 05:23 )  0.163 ng/mL / x     / x     / x     / x      CARDIAC MARKERS ( 23 Jun 2019 23:32 )  0.168 ng/mL / x     / x     / x     / x          Blood Culture:         RADIOLOGY/EKG:
GI-coverage for Dr. Otero    79 year old man with history of CAD- stents x2 on aspirin, HLD- recent surgery for carpal tunnel on the right hand and Elbow surgery with Dr Leblanc who presented to the ED with complaint of chest pain and abdominal pain. CP has been on and off for the past two weeks. Yesterday prior to admission he noticed a "lump" in the mid abdomen just above the umbilicus, and the degree of mild abdominal tenderness that was nonspecific in location.  He had several episodes of severe sudden onset epigastric pain that would last for a short period of time, and was relieved with nitroglycerin.  He complains of early satiety but not postprandial abdominal pain.  He has a history of GERD and takes PPI medication twice daily as he has for many years.  His last EGD was done about 2 years ago.  On arrival to the ER he was found to be febrile with a temperature of 101.8 Fahrenheit and with a white blood cell count of 13.  He was found on imaging to have a right upper lobe pneumonia and was given antibiotics.  Abdominal sonogram showed gallstones but no blood or wall thickening, a HIDA scan was ordered and is pending.    PAST MEDICAL & SURGICAL HISTORY:  S/P coronary artery stent placement: lad x2  Hyperlipemia  Heartburn  Colon adenoma  CAD (coronary artery disease)  Status post tonsillectomy  History of repair of right rotator cuff  History of lumbar laminectomy    MEDICATIONS  (STANDING):  aspirin 325 milliGRAM(s) Oral daily  atorvastatin 80 milliGRAM(s) Oral at bedtime  melatonin 5 milliGRAM(s) Oral at bedtime  mirtazapine 15 milliGRAM(s) Oral at bedtime  pantoprazole    Tablet 40 milliGRAM(s) Oral before breakfast  piperacillin/tazobactam IVPB.. 3.375 Gram(s) IV Intermittent every 8 hours  vancomycin  IVPB 1000 milliGRAM(s) IV Intermittent every 12 hours    Allergies    clindamycin (Other)  morphine (Other)    FAMILY HISTORY:  NC    Soc: no tob/significant EtOH use      ROS: negative for cough  all systems reviewed and otherwise negative    PHYSICAL EXAM:  Vital Signs Last 24 Hrs  T(C): 36.8 (24 Jun 2019 12:53), Max: 38.8 (23 Jun 2019 23:45)  T(F): 98.2 (24 Jun 2019 12:53), Max: 101.8 (23 Jun 2019 23:45)  HR: 63 (24 Jun 2019 12:53) (49 - 100)  BP: 132/73 (24 Jun 2019 12:53) (95/65 - 132/73)  BP(mean): --  RR: 18 (24 Jun 2019 12:53) (16 - 20)  SpO2: 98% (24 Jun 2019 12:53) (89% - 99%)    Constitutional: No acute distress, alert and oriented ×3  wife present    Eyes: Anicteric    Neck: Supple, no JVD    Respiratory: Clear to auscultation bilaterally, breathing comfortably on NC    Cardiovascular: Regular rate and rhythm, no murmur    Gastrointestinal: Soft. Mild periumbilical tenderness, nondistended, bowel sounds present.  No mass.  No hepatosplenomegaly. No palpable hernia. No RUQ discomfort or Griffiths's sign.    Rectal: Deferred    Extremities: Warm, well-perfused, no edema    Neurological: Grossly intact, no focal deficits    Skin: No lesion or rash    Lymph Nodes: No cervical or supraclavicular lymphadenopathy    Psychiatric: Mood and affect seem normal                          14.5   11.20 )-----------( 184      ( 24 Jun 2019 10:53 )             43.8     06-24    141  |  107  |  14  ----------------------------<  72  4.2   |  27  |  1.08    Ca    8.3<L>      24 Jun 2019 10:53    TPro  6.7  /  Alb  3.0<L>  /  TBili  0.8  /  DBili  x   /  AST  20  /  ALT  25  /  AlkPhos  68  06-24    Imaging reviewed:  CT negative for cholecystitis, +gallstones  sonogram +gallstones    EGD 12/2017 reviewed-small hiatal hernia and irreg Z-line

## 2019-06-24 NOTE — H&P ADULT - HISTORY OF PRESENT ILLNESS
79 year old man with history of CAD- stents x2 on aspirin, HLD- recent surgery for carpal tunnel on the right hand and Elbow surgery with Dr Leblanc who presented to the ED with complaint of chest pain and abdominal pain. CP has been on and off for the past two weeks, he saw Dr Mazariegos- cardiac workup was done which was "ok" as per patient. He was prescribed Nitro PRN. Yesterday after lunch he started having epigastric abdominal pain and chills, denies fever. He had a recent abdominal sonogram at his PCP office and was told he has  gallstones. In the ED patient had a fever of 101.8 with leukocytosis. Chest CT showed RUL pnuemonia- patient was started on Vanco and zosyn. + elevated troponin- cardiology consulted.     PMH- carpal tunnel syndrome  CAD, HLD  Heartburn  PSH-CAD with stents  Carpal tunnel surgery x2  elbow surgery  back surgery  FMH- heart disease  Father  Stroke- Mother  Social- does not smoke- drinks on occasions

## 2019-06-24 NOTE — H&P ADULT - NSHPREVIEWOFSYSTEMS_GEN_ALL_CORE
REVIEW OF SYSTEMS:    CONSTITUTIONAL: No weakness, fevers + chills  EYES/ENT: No visual changes;  No vertigo or throat pain   NECK: No pain or stiffness  RESPIRATORY: No cough, wheezing, hemoptysis; No shortness of breath  CARDIOVASCULAR: + chest pain, nopalpitations  GASTROINTESTINAL: + abdominal or epigastric pain. No nausea, vomiting, or hematemesis; No diarrhea or constipation. No melena or hematochezia.  GENITOURINARY: No dysuria, frequency or hematuria  NEUROLOGICAL: No numbness or weakness  SKIN: No itching, burning, rashes, or lesions   All other review of systems is negative unless indicated above.

## 2019-06-24 NOTE — CONSULT NOTE ADULT - ASSESSMENT
79 year old man with history of CAD- stents x2 on aspirin, HLD- recent surgery for carpal tunnel on the right hand and Elbow surgery with Dr Leblanc who presented to the ED with complaint of chest pain and abdominal pain. CP has been on and off for the past two weeks, he saw Dr Mazariegos- cardiac workup was done which was "ok" as per patient. He was prescribed Nitro PRN. Yesterday after lunch he started having epigastric abdominal pain and chills, denies fever. He had a recent abdominal sonogram at his PCP office and was told he has  gallstones. In the ED patient had a fever of 101.8 with leukocytosis. Chest CT showed RUL pnuemonia- patient was started on Vanco and zosyn. + elevated troponin- cardiology consulted.     Problem/Plan - 1:  ·  Problem: Epigastric pain.  Plan: r/o AC/ gallstone  Pancreatitis  CT abdomen showed tiny gallstones in the gallbladder  Negative murphys sign  check amylase and lipase  check HIDA  Keep NPO after midnight for HIDA  GI consult  recheck Liver panel  CT also showed increase prostate size- patient aware-  will need outpatient f/u with urologist  distended urinary bladder but no hydro on CT- check bladder scan  SOnogram showed - Cholelithiasis without sonographic signs of cholecystitis. Fatty liver and hepatomegaly.     Problem/Plan - 2:  ·  Problem: Pneumonia of right upper lobe due to infectious organism.  Plan: HCAP -concern for GNR and other resistant bacteria  on Zosyn and Vanco-  continue  ID consult pending  - monitor tmeps  monitor s/e of antibiotics  f/u blood culture.     Problem/Plan - 3:  ·  Problem: Chest pain, unspecified type.  Plan: trend troponin  tele monitoring.     Problem/Plan - 4:  ·  Problem: Coronary artery disease without angina pectoris, continue BB and ASA. Angiogram as above. He may need another coronary angiogram.

## 2019-06-24 NOTE — H&P ADULT - PROBLEM SELECTOR PLAN 1
r/o AC/ gallstone  Pancreatitis  CT abdomen showed tiny gallstones in the gallbladder  Negative murphys sign  check amylase and lipase  check HIDA  Keep NPO after midnight for HIDA  GI consult  recheck Liver panel  CT also showed increase prostate size- patient aware-  will need outpatient f/u with urologist  distended urinary bladder but no hydro on CT- check bladder scan  SOnogram showed - Cholelithiasis without sonographic signs of cholecystitis. Fatty liver and hepatomegaly.

## 2019-06-24 NOTE — H&P ADULT - NSHPLABSRESULTS_GEN_ALL_CORE
< from: CT Chest w/ IV Cont (06.24.19 @ 02:36) >    EXAM:  CT CHEST IC                          EXAM:  CT ABDOMEN AND PELVIS IC                            PROCEDURE DATE:  06/24/2019          INTERPRETATION:  CLINICAL INFORMATION: Fever, hypoxia and abdominal pain.    TECHNIQUE: Contrast-enhanced axial images of the chest, abdomen and   pelvis were obtained with coronal and sagittal reformats. 90 cc Omnipaque   350 were administered intravenously and 10 cc were discarded.    COMPARISON: CT angiogram chest and abdomen 5/20/2013.    FINDINGS:   CHEST:  Lungs and airways: The trachea and main bronchi are patent.  Patchy   opacity in the right upper lobe. Dependent bilateral lower lobe   atelectasis. There is no pleural effusion or pneumothorax.     Heart and vessels: The heart size is normal. Coronary artery   calcifications. There is no pericardial effusion. The thoracic aorta and   main pulmonary artery are normal caliber.     Mediastinum and soft tissues: The thyroid gland is unremarkable. There is   no axillary, mediastinal or hilar adenopathy.     Bones: No acute abnormality. Spinal degenerative changes and posterior   thoracolumbar spinal fixation with bilateral transpedicular screws and   interconnecting rods in T12.    ABDOMEN AND PELVIS:  Hepatobiliary: Tiny gallstones in the gallbladder. Mural nodularity at   the fundus consistent with adenomyomatosis. No CT signs of cholecystitis.   No biliary ductal dilation. Liver within normal limits.  Pancreas: Within normal limits.  Spleen: Within normal limits.  Adrenals: Within normal limits.    Kidneys/Ureters/Bladder: Symmetric nephrographic phase of renal   enhancement. Bilateral extrarenal pelves and left renal peripelvic cysts.   No hydronephrosis. Distended urinary bladder with secondary fullness of   the renal collectingsystem bilaterally.  Reproductive Organs: Mild prostatomegaly measuring 5 x 4 x 5.6 cm with   nodular indentation of the bladder base.    Bowel/Peritoneum: No bowel obstruction, inflammation or pneumoperitoneum.    Visualized appendiceal base is normal.  Portions of the gastrointestinal   tract are collapsed, limiting evaluation.    Vessels:  Mild calcific atherosclerosis. No abdominal aortic aneurysm.  Lymphatics/Retroperitoneum: No lymphadenopathy. No retroperitoneal   hematoma.      Soft Tissues: Within normal limits.  Bones: Generalized osteopenia limits evaluation of the lumbar spine.   Transitional lumbosacral anatomy with apparent lumbarized S1. Possible   fusion of the mid lumbar vertebral bodies. Laminectomy and posterior   spinal fusion with bilateral transpedicular screws and interconnecting   rods from T12 to S1. Hardware appears intact without periprosthetic   lucency. Streak artifact from the hardware limits evaluation of the   surrounding structures.  Destructive L5-S1 endplate changes with metallic   intervertebral disc space density, possibly a disc spacer, probably   chronic as presence of vacuum disc change makes acute discitis less   likely.    IMPRESSION:  CT Chest: Right upper lobe opacity which is nonspecific and may reflect   pneumonia or other infiltrate. Follow-up chest CT in 6 weeks recommended   to ensure resolution.    CT Abdomen and Pelvis: Distended urinary bladder with secondary fullness   of the renal collecting system bilaterally. No discernible acute bowel   inflammation.      < end of copied text >

## 2019-06-24 NOTE — ED ADULT NURSE REASSESSMENT NOTE - NS ED NURSE REASSESS COMMENT FT1
Patient A&Ox4, resting in bed. VSS, denies pain/discomfort at this time. No s/s of distress present. Clammy, afebrile; bed linens and gown changed. Wait time explained, hospitalist consult pending. Safety & comfort measures in place, spouse bedside. Will continue to monitor.

## 2019-06-24 NOTE — H&P ADULT - PROBLEM SELECTOR PLAN 3
trend troponin  cardiology consulted  tele monitoring trend troponin  cardiolgoy consulted  tele monitoring

## 2019-06-24 NOTE — CONSULT NOTE ADULT - ASSESSMENT
79M with upper abdominal pain x 2 weeks, on and off, associated with early satiety, relieved with NTG, and with gallstones on imaging but no sign of cholecystitis or choledocholithiasis on imaging or labs.  Rule out functional pain/spasm, gastritis, esophagitis, esophageal spasm, atypical presentation of biliary colic. Doubt retained CBD stone with normal labs. Atypical presentation of RUL PNA as well.    Recommend:  -continue supportive care, abx  -continue PPI  -trend WBC, LFTs   -HIDA scan scheduled tomorrow  -if pain persists and HIDA negative will plan for EGD Weds  -d/w pt and wife in detail  will follow

## 2019-06-24 NOTE — ED ADULT NURSE REASSESSMENT NOTE - NS ED NURSE REASSESS COMMENT FT1
Patient remains as previously assessed. Resting comfortably in bed, no s/s of distress present. Hand-off report to RN Demetria, awaiting transport to . Plan of care discussed. Safety & comfort measures in place, purposeful active rounding on my time.

## 2019-06-25 LAB
ANION GAP SERPL CALC-SCNC: 8 MMOL/L — SIGNIFICANT CHANGE UP (ref 5–17)
BASOPHILS # BLD AUTO: 0.05 K/UL — SIGNIFICANT CHANGE UP (ref 0–0.2)
BASOPHILS NFR BLD AUTO: 0.5 % — SIGNIFICANT CHANGE UP (ref 0–2)
BUN SERPL-MCNC: 14 MG/DL — SIGNIFICANT CHANGE UP (ref 7–23)
CALCIUM SERPL-MCNC: 8.6 MG/DL — SIGNIFICANT CHANGE UP (ref 8.5–10.1)
CHLORIDE SERPL-SCNC: 103 MMOL/L — SIGNIFICANT CHANGE UP (ref 96–108)
CO2 SERPL-SCNC: 24 MMOL/L — SIGNIFICANT CHANGE UP (ref 22–31)
CREAT SERPL-MCNC: 1.1 MG/DL — SIGNIFICANT CHANGE UP (ref 0.5–1.3)
CULTURE RESULTS: NO GROWTH — SIGNIFICANT CHANGE UP
EOSINOPHIL # BLD AUTO: 0.08 K/UL — SIGNIFICANT CHANGE UP (ref 0–0.5)
EOSINOPHIL NFR BLD AUTO: 0.8 % — SIGNIFICANT CHANGE UP (ref 0–6)
GLUCOSE SERPL-MCNC: 127 MG/DL — HIGH (ref 70–99)
HCT VFR BLD CALC: 42.9 % — SIGNIFICANT CHANGE UP (ref 39–50)
HGB BLD-MCNC: 14.3 G/DL — SIGNIFICANT CHANGE UP (ref 13–17)
IMM GRANULOCYTES NFR BLD AUTO: 0.5 % — SIGNIFICANT CHANGE UP (ref 0–1.5)
LYMPHOCYTES # BLD AUTO: 0.91 K/UL — LOW (ref 1–3.3)
LYMPHOCYTES # BLD AUTO: 8.9 % — LOW (ref 13–44)
MCHC RBC-ENTMCNC: 31 PG — SIGNIFICANT CHANGE UP (ref 27–34)
MCHC RBC-ENTMCNC: 33.3 GM/DL — SIGNIFICANT CHANGE UP (ref 32–36)
MCV RBC AUTO: 93.1 FL — SIGNIFICANT CHANGE UP (ref 80–100)
MONOCYTES # BLD AUTO: 0.96 K/UL — HIGH (ref 0–0.9)
MONOCYTES NFR BLD AUTO: 9.4 % — SIGNIFICANT CHANGE UP (ref 2–14)
NEUTROPHILS # BLD AUTO: 8.19 K/UL — HIGH (ref 1.8–7.4)
NEUTROPHILS NFR BLD AUTO: 79.9 % — HIGH (ref 43–77)
PLATELET # BLD AUTO: 176 K/UL — SIGNIFICANT CHANGE UP (ref 150–400)
POTASSIUM SERPL-MCNC: 3.7 MMOL/L — SIGNIFICANT CHANGE UP (ref 3.5–5.3)
POTASSIUM SERPL-SCNC: 3.7 MMOL/L — SIGNIFICANT CHANGE UP (ref 3.5–5.3)
RBC # BLD: 4.61 M/UL — SIGNIFICANT CHANGE UP (ref 4.2–5.8)
RBC # FLD: 13.9 % — SIGNIFICANT CHANGE UP (ref 10.3–14.5)
SODIUM SERPL-SCNC: 135 MMOL/L — SIGNIFICANT CHANGE UP (ref 135–145)
SPECIMEN SOURCE: SIGNIFICANT CHANGE UP
WBC # BLD: 10.24 K/UL — SIGNIFICANT CHANGE UP (ref 3.8–10.5)
WBC # FLD AUTO: 10.24 K/UL — SIGNIFICANT CHANGE UP (ref 3.8–10.5)

## 2019-06-25 PROCEDURE — 78226 HEPATOBILIARY SYSTEM IMAGING: CPT | Mod: 26

## 2019-06-25 RX ADMIN — Medication 5 MILLIGRAM(S): at 20:48

## 2019-06-25 RX ADMIN — PIPERACILLIN AND TAZOBACTAM 25 GRAM(S): 4; .5 INJECTION, POWDER, LYOPHILIZED, FOR SOLUTION INTRAVENOUS at 08:03

## 2019-06-25 RX ADMIN — PANTOPRAZOLE SODIUM 40 MILLIGRAM(S): 20 TABLET, DELAYED RELEASE ORAL at 05:25

## 2019-06-25 RX ADMIN — PIPERACILLIN AND TAZOBACTAM 25 GRAM(S): 4; .5 INJECTION, POWDER, LYOPHILIZED, FOR SOLUTION INTRAVENOUS at 15:16

## 2019-06-25 RX ADMIN — PIPERACILLIN AND TAZOBACTAM 25 GRAM(S): 4; .5 INJECTION, POWDER, LYOPHILIZED, FOR SOLUTION INTRAVENOUS at 22:11

## 2019-06-25 RX ADMIN — Medication 325 MILLIGRAM(S): at 12:29

## 2019-06-25 RX ADMIN — ATORVASTATIN CALCIUM 80 MILLIGRAM(S): 80 TABLET, FILM COATED ORAL at 20:47

## 2019-06-25 RX ADMIN — MIRTAZAPINE 15 MILLIGRAM(S): 45 TABLET, ORALLY DISINTEGRATING ORAL at 20:48

## 2019-06-25 RX ADMIN — Medication 250 MILLIGRAM(S): at 20:32

## 2019-06-25 RX ADMIN — Medication 250 MILLIGRAM(S): at 06:38

## 2019-06-25 NOTE — CDI QUERY NOTE - NSCDIOTHERTXTBX2_GEN_ALL_CORE_FT
Documentation on chart on H&P:  Epigastric pain.  Plan: r/o AC/ gallstone  Pancreatitis  CT abdomen showed tiny gallstones in the gallbladder  Negative murphys sign  check amylase and lipase  check HIDA  Keep NPO after midnight for HIDA  GI consult      GI documented:  79M with upper abdominal pain x 2 weeks, on and off, associated with early satiety, relieved with NTG, and with gallstones on imaging but no sign of cholecystitis or choledocholithiasis on imaging or labs.  Rule out functional pain/spasm, gastritis, esophagitis, esophageal spasm, atypical presentation of biliary colic. Doubt retained CBD stone with normal labs. Atypical presentation of RUL PNA as well.    Please clarify if gallstone Pancreatitis was ruled in or ruled out.

## 2019-06-25 NOTE — CHART NOTE - NSCHARTNOTEFT_GEN_A_CORE
RN notified, pt with temp of 101.1  Pt was admitted for pna. Pt is on vanco and zosyn.   Blood cultures were sent yesterday. Pending results.  Tylenol given

## 2019-06-25 NOTE — PROGRESS NOTE ADULT - SUBJECTIVE AND OBJECTIVE BOX
Patient is a 79y old  Male who presents with a chief complaint of abdominal pain and chest pain (24 Jun 2019 09:36)      Date of service: 06-25-19 @ 12:17      Patient notes persistent mid epigastric pain  No cough and no fever today, but was febrile last evening      ROS: no chills; denies dizziness, no HA, no SOB,  no abdominal pain, no diarrhea or constipation; no dysuria, no urinary frequency, no legs pain, no rashes    MEDICATIONS  (STANDING):  aspirin 325 milliGRAM(s) Oral daily  atorvastatin 80 milliGRAM(s) Oral at bedtime  melatonin 5 milliGRAM(s) Oral at bedtime  mirtazapine 15 milliGRAM(s) Oral at bedtime  pantoprazole    Tablet 40 milliGRAM(s) Oral before breakfast  piperacillin/tazobactam IVPB.. 3.375 Gram(s) IV Intermittent every 8 hours  vancomycin  IVPB 1000 milliGRAM(s) IV Intermittent every 12 hours    MEDICATIONS  (PRN):  acetaminophen   Tablet .. 650 milliGRAM(s) Oral once PRN Temp greater or equal to 38C (100.4F)  nitroglycerin     SubLingual 0.4 milliGRAM(s) SubLingual every 5 minutes PRN Chest Pain  ondansetron Injectable 4 milliGRAM(s) IV Push every 6 hours PRN Nausea      Vital Signs Last 24 Hrs  T(C): 36.7 (25 Jun 2019 11:24), Max: 38.4 (24 Jun 2019 20:10)  T(F): 98.1 (25 Jun 2019 11:24), Max: 101.1 (24 Jun 2019 20:10)  HR: 66 (25 Jun 2019 11:24) (63 - 90)  BP: 118/71 (25 Jun 2019 11:24) (115/67 - 158/77)  BP(mean): --  RR: 17 (25 Jun 2019 11:24) (17 - 18)  SpO2: 95% (25 Jun 2019 11:24) (95% - 99%)    Physical Exam:          Constitutional: frail looking  HEENT: NC/AT, EOMI, PERRLA, conjunctivae clear; ears and nose atraumatic; pharynx clear  Neck: supple; thyroid not palpable  Back: no tenderness  Respiratory: respiratory effort normal; clear to auscultation  Cardiovascular: S1S2 regular, no murmurs  Abdomen: soft, not tender, not distended, positive BS; no liver or spleen organomegaly  Genitourinary: no suprapubic tenderness  Musculoskeletal: no muscle tenderness, no joint swelling or tenderness  Neurological/ Psychiatric: AxOx3, judgement and insight normal;  moving all extremities  Skin: no rashes; no palpable lesions    Labs: all available labs reviewed                       Labs:                        14.3   10.24 )-----------( 176      ( 25 Jun 2019 07:34 )             42.9     06-25    135  |  103  |  14  ----------------------------<  127<H>  3.7   |  24  |  1.10    Ca    8.6      25 Jun 2019 07:34    TPro  6.7  /  Alb  3.0<L>  /  TBili  0.8  /  DBili  x   /  AST  20  /  ALT  25  /  AlkPhos  68  06-24           Cultures:       Culture - Urine (collected 06-24-19 @ 02:01)  Source: .Urine None  Final Report (06-25-19 @ 07:32):    No growth    Culture - Blood (collected 06-23-19 @ 23:32)  Source: .Blood Blood-Peripheral  Preliminary Report (06-25-19 @ 09:01):    No growth to date.    Culture - Blood (collected 06-23-19 @ 23:32)  Source: .Blood Blood-Peripheral  Preliminary Report (06-25-19 @ 09:01):    No growth to date.      < from: NM Hepatobiliary Imaging (06.25.19 @ 11:27) >    EXAM:  NM HEPATOBILIARY IMG                            PROCEDURE DATE:  06/25/2019          INTERPRETATION:    RADIOPHARMACEUTICAL:  99mTc-Mebrofenin  DOSE: 3.4 mCi IV    CLINICAL INFORMATION: 79 year old male with right upper quadrant   abdominal pain and cholelithiasis, referred to evaluate for acute   cholecystitis    TECHNIQUE: Dynamic images of the anterior abdomen were obtained for 90   minutes immediately following radiotracer injection. Static images of the   abdomen in the anterior, right anterior oblique and right lateral   projections were also obtained.    COMPARISON:  No previous hepatobiliary scan for comparison    FINDINGS: There is prompt uptake of the injected radiotracer by the   hepatocytes. The gallbladder is first visualized at 10 minutes post   tracer injection and bowel activity by 65 minutes. There is normal tracer   clearance from the liver by the end of the study.     IMPRESSION: Normal hepatobiliary scan.     No scan evidence of acute cholecystitis.       < end of copied text >      < from: CT Chest w/ IV Cont (06.24.19 @ 02:36) >    EXAM:  CT CHEST IC                          EXAM:  CT ABDOMEN AND PELVIS IC                            PROCEDURE DATE:  06/24/2019          INTERPRETATION:  CLINICAL INFORMATION: Fever, hypoxia and abdominal pain.    TECHNIQUE: Contrast-enhanced axial images of the chest, abdomen and   pelvis were obtained with coronal and sagittal reformats. 90 cc Omnipaque   350 were administered intravenously and 10 cc were discarded.    COMPARISON: CT angiogram chest and abdomen 5/20/2013.    FINDINGS:   CHEST:  Lungs and airways: The trachea and main bronchi are patent.  Patchy   opacity in the right upper lobe. Dependent bilateral lower lobe   atelectasis. There is no pleural effusion or pneumothorax.     Heart and vessels: The heart size is normal. Coronary artery   calcifications. There is no pericardial effusion. The thoracic aorta and   main pulmonary artery are normal caliber.     Mediastinum and soft tissues: The thyroid gland is unremarkable. There is   no axillary, mediastinal or hilar adenopathy.     Bones: No acute abnormality. Spinal degenerative changes and posterior   thoracolumbar spinal fixation with bilateral transpedicular screws and   interconnecting rods in T12.    ABDOMEN AND PELVIS:  Hepatobiliary: Tiny gallstones in the gallbladder. Mural nodularity at   the fundus consistent with adenomyomatosis. No CT signs of cholecystitis.   No biliary ductal dilation. Liver within normal limits.  Pancreas: Within normal limits.  Spleen: Within normal limits.  Adrenals: Within normal limits.    Kidneys/Ureters/Bladder: Symmetric nephrographic phase of renal   enhancement. Bilateral extrarenal pelves and left renal peripelvic cysts.   No hydronephrosis. Distended urinary bladder with secondary fullness of   the renal collectingsystem bilaterally.  Reproductive Organs: Mild prostatomegaly measuring 5 x 4 x 5.6 cm with   nodular indentation of the bladder base.    Bowel/Peritoneum: No bowel obstruction, inflammation or pneumoperitoneum.    Visualized appendiceal base is normal.  Portions of the gastrointestinal   tract are collapsed, limiting evaluation.    Vessels:  Mild calcific atherosclerosis. No abdominal aortic aneurysm.  Lymphatics/Retroperitoneum: No lymphadenopathy. No retroperitoneal   hematoma.      Soft Tissues: Within normal limits.  Bones: Generalized osteopenia limits evaluation of the lumbar spine.   Transitional lumbosacral anatomy with apparent lumbarized S1. Possible   fusion of the mid lumbar vertebral bodies. Laminectomy and posterior   spinal fusion with bilateral transpedicular screws and interconnecting   rods from T12 to S1. Hardware appears intact without periprosthetic   lucency. Streak artifact from the hardware limits evaluation of the   surrounding structures.  Destructive L5-S1 endplate changes with metallic   intervertebral disc space density, possibly a disc spacer, probably   chronic as presence of vacuum disc change makes acute discitis less   likely.    IMPRESSION:  CT Chest: Right upper lobe opacity which is nonspecific and may reflect   pneumonia or other infiltrate. Follow-up chest CT in 6 weeks recommended   to ensure resolution.    CT Abdomen and Pelvis: Distended urinary bladder with secondary fullness   of the renal collecting system bilaterally. No discernible acute bowel   inflammation.    Additional incidental findings as described in the body of the report.        < end of copied text >      Radiology: all available radiological tests reviewed    Advanced directives addressed: full resuscitation

## 2019-06-25 NOTE — PROGRESS NOTE ADULT - SUBJECTIVE AND OBJECTIVE BOX
c/c: epigastric pain    HPI:  79 year old man with history of CAD- stents x2 on aspirin, HLD- recent surgery for carpal tunnel on the right hand and Elbow surgery with Dr Leblanc who presented to the ED with complaint of chest pain and abdominal pain. CP has been on and off for the past two weeks, he saw Dr Mazariegos- cardiac workup was done which was "ok" as per patient. He was prescribed Nitro PRN. , after lunch he started having epigastric abdominal pain and chills, denies fever. He had a recent abdominal sonogram at his PCP office and was told he has  gallstones. In the ED patient had a fever of 101.8 with leukocytosis. Chest CT showed RUL pneumonia- patient was started on Vanco and zosyn. + elevated troponin- cardiology consulted. He was admitted for further management.     : pt seen and examined this am. Was feeling better. States epigastric pain had no relation to food. NO associated n/v. Felt like a burning type of pain.     PMH- carpal tunnel syndrome  CAD, HLD  Heartburn  PSH-CAD with stents  Carpal tunnel surgery x2  elbow surgery  back surgery  FMH- heart disease  Father  Stroke- Mother  Social- does not smoke- drinks on occasions (2019 09:36)      Review of system- All 10 systems reviewed and is as per HPI otherwise negative.           T(C): 36.7 (19 @ 11:24), Max: 38.4 (19 @ 20:10)  HR: 66 (19 @ 11:24) (63 - 90)  BP: 118/71 (19 @ 11:24) (115/67 - 158/77)  RR: 17 (19 @ 11:24) (17 - 17)  SpO2: 95% (19 @ 11:24) (95% - 99%)  Wt(kg): --    PHYSICAL EXAM:    GENERAL: Comfortable, no acute distress  HEAD:  Atraumatic, Normocephalic  EYES: EOMI, PERRLA  HEENT: Moist mucous membranes  NECK: Supple, No JVD  NERVOUS SYSTEM:  Alert & Oriented X3, Motor Strength 5/5 B/L upper and lower extremities  CHEST/LUNG: Clear to auscultation bilaterally  HEART: Regular rate and rhythm; No murmurs, rubs, or gallops  ABDOMEN: Soft, Nontender, Nondistended; Bowel sounds present  GENITOURINARY- Voiding, no palpable bladder  EXTREMITIES:  No clubbing, cyanosis, or edema  MUSCULOSKELTAL- No muscle tenderness, No joint tenderness  SKIN-no rash        LABS:                        14.3   10.24 )-----------( 176      ( 2019 07:34 )             42.9     06    135  |  103  |  14  ----------------------------<  127<H>  3.7   |  24  |  1.10    Ca    8.6      2019 07:34    TPro  6.7  /  Alb  3.0<L>  /  TBili  0.8  /  DBili  x   /  AST  20  /  ALT  25  /  AlkPhos  68  06-24    PT/INR - ( 2019 23:32 )   PT: 12.1 sec;   INR: 1.09 ratio         PTT - ( 2019 23:32 )  PTT:30.9 sec  Urinalysis Basic - ( 2019 02:01 )    Color: Yellow / Appearance: Clear / S.005 / pH: x  Gluc: x / Ketone: Negative  / Bili: Negative / Urobili: Negative mg/dL   Blood: x / Protein: Negative mg/dL / Nitrite: Negative   Leuk Esterase: Negative / RBC: x / WBC x   Sq Epi: x / Non Sq Epi: x / Bacteria: x        CAPILLARY BLOOD GLUCOSE          CAPILLARY BLOOD GLUCOSE        CAPILLARY BLOOD GLUCOSE          CARDIAC MARKERS ( 2019 10:53 )  0.124 ng/mL / x     / x     / x     / x      CARDIAC MARKERS ( 2019 05:23 )  0.163 ng/mL / x     / x     / x     / x      CARDIAC MARKERS ( 2019 23:32 )  0.168 ng/mL / x     / x     / x     / x            MEDS  acetaminophen   Tablet .. 650 milliGRAM(s) Oral once PRN  aspirin 325 milliGRAM(s) Oral daily  atorvastatin 80 milliGRAM(s) Oral at bedtime  melatonin 5 milliGRAM(s) Oral at bedtime  mirtazapine 15 milliGRAM(s) Oral at bedtime  nitroglycerin     SubLingual 0.4 milliGRAM(s) SubLingual every 5 minutes PRN  ondansetron Injectable 4 milliGRAM(s) IV Push every 6 hours PRN  pantoprazole    Tablet 40 milliGRAM(s) Oral before breakfast  piperacillin/tazobactam IVPB.. 3.375 Gram(s) IV Intermittent every 8 hours  vancomycin  IVPB 1000 milliGRAM(s) IV Intermittent every 12 hours c/c: epigastric pain    HPI:  79 year old man with history of CAD- stents x2 on aspirin, HLD- recent surgery for carpal tunnel on the right hand and Elbow surgery with Dr Leblanc who presented to the ED with complaint of chest pain and abdominal pain. CP has been on and off for the past two weeks, he saw Dr Mazariegos- cardiac workup was done which was "ok" as per patient. He was prescribed Nitro PRN. , after lunch he started having epigastric abdominal pain and chills, denies fever. He had a recent abdominal sonogram at his PCP office and was told he has  gallstones. In the ED patient had a fever of 101.8 with leukocytosis. Chest CT showed RUL pneumonia- patient was started on Vanco and zosyn. + elevated troponin- cardiology consulted. He was admitted for further management.     : pt seen and examined this am. Was feeling better. States epigastric pain had no relation to food. NO associated n/v. Felt like a burning type of pain.         Review of system- All 10 systems reviewed and is as per HPI otherwise negative.     VITALS  T(C): 36.7 (19 @ 11:24), Max: 38.4 (19 @ 20:10)  HR: 66 (19 @ 11:24) (63 - 90)  BP: 118/71 (19 @ 11:24) (115/67 - 158/77)  RR: 17 (19 @ 11:24) (17 - 17)  SpO2: 95% (19 @ 11:24) (95% - 99%)      PHYSICAL EXAM:    GENERAL: Comfortable, no acute distress  HEAD:  Atraumatic, Normocephalic  EYES: EOMI, PERRLA  HEENT: Moist mucous membranes  NECK: Supple, No JVD  NERVOUS SYSTEM:  Alert & Oriented X3, Motor Strength 5/5 B/L upper and lower extremities  CHEST/LUNG: Clear to auscultation bilaterally  HEART: Regular rate and rhythm; No murmurs, rubs, or gallops  ABDOMEN: Soft, Nontender, Nondistended; Bowel sounds present  GENITOURINARY- Voiding, no palpable bladder  EXTREMITIES:  No clubbing, cyanosis, or edema  MUSCULOSKELTAL- No muscle tenderness, No joint tenderness  SKIN-no rash        LABS:                        14.3   10.24 )-----------( 176      ( 2019 07:34 )             42.9     06    135  |  103  |  14  ----------------------------<  127<H>  3.7   |  24  |  1.10    Ca    8.6      2019 07:34    TPro  6.7  /  Alb  3.0<L>  /  TBili  0.8  /  DBili  x   /  AST  20  /  ALT  25  /  AlkPhos  68  06-24    PT/INR - ( 2019 23:32 )   PT: 12.1 sec;   INR: 1.09 ratio         PTT - ( 2019 23:32 )  PTT:30.9 sec  Urinalysis Basic - ( 2019 02:01 )    Color: Yellow / Appearance: Clear / S.005 / pH: x  Gluc: x / Ketone: Negative  / Bili: Negative / Urobili: Negative mg/dL   Blood: x / Protein: Negative mg/dL / Nitrite: Negative   Leuk Esterase: Negative / RBC: x / WBC x   Sq Epi: x / Non Sq Epi: x / Bacteria: x        CARDIAC MARKERS ( 2019 10:53 )  0.124 ng/mL / x     / x     / x     / x      CARDIAC MARKERS ( 2019 05:23 )  0.163 ng/mL / x     / x     / x     / x      CARDIAC MARKERS ( 2019 23:32 )  0.168 ng/mL / x     / x     / x     / x            MEDS  acetaminophen   Tablet .. 650 milliGRAM(s) Oral once PRN  aspirin 325 milliGRAM(s) Oral daily  atorvastatin 80 milliGRAM(s) Oral at bedtime  melatonin 5 milliGRAM(s) Oral at bedtime  mirtazapine 15 milliGRAM(s) Oral at bedtime  nitroglycerin     SubLingual 0.4 milliGRAM(s) SubLingual every 5 minutes PRN  ondansetron Injectable 4 milliGRAM(s) IV Push every 6 hours PRN  pantoprazole    Tablet 40 milliGRAM(s) Oral before breakfast  piperacillin/tazobactam IVPB.. 3.375 Gram(s) IV Intermittent every 8 hours  vancomycin  IVPB 1000 milliGRAM(s) IV Intermittent every 12 hours    ASSESSMENT AND PLAN:  79m, PMH as above a/w:    1. Pneumonia/HCAP with concern for resistant gnr and/or gram positive organism.   -ID eval appreciated.   -continue iv abx.  -f/u cultures.  -NO SEPSIS.     2.  Epigastric pain:  -?related to gastritis.   - ppi  - gallstone pancreatitis ruled out  - HIDA negative.   - GI eval noted-->EGD    3. h/o CAD with elevated troponins/intermittent chest pain:  -cardio eval appreciated, may need angiogram for further evaluation.     4. HLD:  -statin.     5. DVT px  -ambulate

## 2019-06-25 NOTE — PROGRESS NOTE ADULT - SUBJECTIVE AND OBJECTIVE BOX
GI-coverage for Dr. Otero    still with dull epigastric discomfort and tenderness  no severe pain  no n/v  HIDA scan negative    ROS: negative for cough  all systems reviewed and otherwise negative    PHYSICAL EXAM:  Vital Signs Last 24 Hrs  T(C): 36.7 (25 Jun 2019 11:24), Max: 38.4 (24 Jun 2019 20:10)  T(F): 98.1 (25 Jun 2019 11:24), Max: 101.1 (24 Jun 2019 20:10)  HR: 66 (25 Jun 2019 11:24) (63 - 90)  BP: 118/71 (25 Jun 2019 11:24) (115/67 - 158/77)  BP(mean): --  RR: 17 (25 Jun 2019 11:24) (17 - 17)  SpO2: 95% (25 Jun 2019 11:24) (95% - 99%)    Constitutional: No acute distress, alert and oriented ×3    Eyes: Anicteric    Neck: Supple, no JVD    Respiratory: Clear to auscultation bilaterally, breathing comfortably on NC    Cardiovascular: Regular rate and rhythm, no murmur    Gastrointestinal: Soft. Mild epigastric tenderness, nondistended, bowel sounds present.  No mass.  No hepatosplenomegaly. No palpable hernia. No RUQ discomfort or Griffiths's sign.    Rectal: Deferred    Extremities: Warm, well-perfused, no edema    Neurological: Grossly intact, no focal deficits    Skin: No lesion or rash    Lymph Nodes: No cervical or supraclavicular lymphadenopathy    Psychiatric: Mood and affect seem normal                                               14.3   10.24 )-----------( 176      ( 25 Jun 2019 07:34 )             42.9   06-25    135  |  103  |  14  ----------------------------<  127<H>  3.7   |  24  |  1.10    Ca    8.6      25 Jun 2019 07:34    TPro  6.7  /  Alb  3.0<L>  /  TBili  0.8  /  DBili  x   /  AST  20  /  ALT  25  /  AlkPhos  68  06-24

## 2019-06-25 NOTE — PROGRESS NOTE ADULT - SUBJECTIVE AND OBJECTIVE BOX
CHIEF COMPLAINT:    HPI:  79 year old man with history of CAD- stents x2 on aspirin, HLD- recent surgery for carpal tunnel on the right hand and Elbow surgery with Dr Leblanc who presented to the ED with complaint of chest pain and abdominal pain. CP has been on and off for the past two weeks, he saw Dr Mazariegos- cardiac workup was done which was "ok" as per patient. He was prescribed Nitro PRN. Yesterday after lunch he started having epigastric abdominal pain and chills, denies fever. He had a recent abdominal sonogram at his PCP office and was told he has  gallstones. In the ED patient had a fever of 101.8 with leukocytosis. Chest CT showed RUL pnuemonia- patient was started on Vanco and zosyn. + elevated troponin. Pt had a coronary angiogram in Feb 2019 that showed normal coronary arteries and patent stents. He has had chest pain last week but on admission had epigastric pain and on exam epigastric tenderness. EKG showed sinus rhythm with PAC's. Trop elevated x3 but flat. LFT's normal x2. Amylase and lipase normal. Discussed getting HIDA scan with Dr. Pena and NP.    6/25: sinus rhythm on monitor, rate 60's with PAC's. Continue epigastric tenderness. HIDA scan today. IV Ab for PNA. Chest pain yesterday relieved by SL NTG, but as stated above cath in Feb showed normal coronaries.  If he has recurrent chest pain he will need another cath. GI saw pt and recommended EGD if HIDA scan normal.  PMH- carpal tunnel syndrome  CAD, HLD  Heartburn  PSH-CAD with stents  Carpal tunnel surgery x2  elbow surgery  back surgery  FMH- heart disease  Father  Stroke- Mother  Social- does not smoke- drinks on occasions (24 Jun 2019 09:36)      PAST MEDICAL & SURGICAL HISTORY:  S/P coronary artery stent placement: lad x2  Hyperlipemia  Heartburn  Colon adenoma  CAD (coronary artery disease)  Status post tonsillectomy  History of repair of right rotator cuff  History of lumbar laminectomy      Allergies    clindamycin (Other)  morphine (Other)    Intolerances        Occupation:  [place]: Denied  Smoking: Denied  Drug Use: Denied  Marital Status:         FAMILY HISTORY:      REVIEW OF SYSTEMS:    CONSTITUTIONAL: No weakness, fevers or chills  EYES/ENT: No visual changes;  No vertigo or throat pain   NECK: No pain or stiffness  RESPIRATORY: No cough, wheezing, hemoptysis; No shortness of breath  CARDIOVASCULAR: No chest pain or palpitations  GASTROINTESTINAL: No abdominal or epigastric pain. No nausea, vomiting, or hematemesis; No diarrhea or constipation. No melena or hematochezia.  GENITOURINARY: No dysuria, frequency or hematuria  NEUROLOGICAL: No numbness or weakness  SKIN: No itching, burning, rashes, or lesions   All other review of systems is negative unless indicated above    Vital Signs Last 24 Hrs  T(C): 36.8 (24 Jun 2019 12:53), Max: 38.8 (23 Jun 2019 23:45)  T(F): 98.2 (24 Jun 2019 12:53), Max: 101.8 (23 Jun 2019 23:45)  HR: 63 (24 Jun 2019 12:53) (49 - 100)  BP: 132/73 (24 Jun 2019 12:53) (95/65 - 132/73)  BP(mean): --  RR: 18 (24 Jun 2019 12:53) (16 - 20)  SpO2: 98% (24 Jun 2019 12:53) (89% - 99%)    I&O's Summary      PHYSICAL EXAM:    Constitutional: NAD, awake and alert, well-developed  HEENT: PERR, EOMI,  No oral cyananosis.  Neck:  supple,  No JVD  Respiratory: Breath sounds are clear bilaterally, No wheezing, rales or rhonchi  Cardiovascular: S1 and S2, regular rate and rhythm, no Murmurs, gallops or rubs  Gastrointestinal: Bowel Sounds present, soft, nontender.   Extremities: No peripheral edema. No clubbing or cyanosis.  Vascular: 2+ peripheral pulses  Neurological: A/O x 3, no focal deficits  Musculoskeletal: no calf tenderness.  Skin: No rashes.    MEDICATIONS:  MEDICATIONS  (STANDING):  aspirin 325 milliGRAM(s) Oral daily  atorvastatin 80 milliGRAM(s) Oral at bedtime  melatonin 5 milliGRAM(s) Oral at bedtime  mirtazapine 15 milliGRAM(s) Oral at bedtime  pantoprazole    Tablet 40 milliGRAM(s) Oral before breakfast  piperacillin/tazobactam IVPB.. 3.375 Gram(s) IV Intermittent every 8 hours  vancomycin  IVPB 1000 milliGRAM(s) IV Intermittent every 12 hours      LABS: All Labs Reviewed:                        14.5   11.20 )-----------( 184      ( 24 Jun 2019 10:53 )             43.8                         14.7   13.50 )-----------( 201      ( 23 Jun 2019 23:32 )             43.2     24 Jun 2019 10:53    141    |  107    |  14     ----------------------------<  72     4.2     |  27     |  1.08   23 Jun 2019 23:32    137    |  102    |  18     ----------------------------<  115    4.1     |  25     |  1.13     Ca    8.3        24 Jun 2019 10:53  Ca    8.4        23 Jun 2019 23:32    TPro  6.7    /  Alb  3.0    /  TBili  0.8    /  DBili  x      /  AST  20     /  ALT  25     /  AlkPhos  68     24 Jun 2019 10:53  TPro  6.7    /  Alb  3.2    /  TBili  0.7    /  DBili  x      /  AST  19     /  ALT  27     /  AlkPhos  70     23 Jun 2019 23:32    PT/INR - ( 23 Jun 2019 23:32 )   PT: 12.1 sec;   INR: 1.09 ratio         PTT - ( 23 Jun 2019 23:32 )  PTT:30.9 sec  CARDIAC MARKERS ( 24 Jun 2019 10:53 )  0.124 ng/mL / x     / x     / x     / x      CARDIAC MARKERS ( 24 Jun 2019 05:23 )  0.163 ng/mL / x     / x     / x     / x      CARDIAC MARKERS ( 23 Jun 2019 23:32 )  0.168 ng/mL / x     / x     / x     / x          Blood Culture:         RADIOLOGY/EKG:

## 2019-06-25 NOTE — CDI QUERY NOTE - NSCDIOTHERTXTBX_GEN_ALL_CORE_HH
Pt. admitted with PNA HCAP s/p carpal tunel and elbow surgery.    R/O Gallstone Pancreatitis    WBC 13 -  - Chills    SUPPORTING DOCUMENTATION AND/OR CLINICAL EVIDENCE:    Blood cultures:    Culture - Blood (collected 06-23-19 @ 23:32)  Source: .Blood Blood-Peripheral  Preliminary Report (06-25-19 @ 09:01):    No growth to date.    Culture - Blood (collected 06-23-19 @ 23:32)  Source: .Blood Blood-Peripheral  Preliminary Report (06-25-19 @ 09:01):    No growth to date.    Antibiotics:   piperacillin/tazobactam IVPB.   200 mL/Hr IV Intermittent (06-23-19 @ 23:57)    piperacillin/tazobactam IVPB..   25 mL/Hr IV Intermittent (06-25-19 @ 08:03)   25 mL/Hr IV Intermittent (06-24-19 @ 22:13)   25 mL/Hr IV Intermittent (06-24-19 @ 15:47)    vancomycin  IVPB   250 mL/Hr IV Intermittent (06-25-19 @ 06:38)   250 mL/Hr IV Intermittent (06-24-19 @ 20:25)    vancomycin  IVPB   166.67 mL/Hr IV Intermittent (06-24-19 @ 01:12)    sodium chloride 0.9% Bolus   2500 mL/Hr IV Bolus (06-23-19 @ 23:45)      Please clarify a diagnosis based on the above clinical findings.    STATUS:  A) Sepsis ruled in and is due to/related to previous carpal tunnel and elbow surgery.   B) Sepsis ruled in but not due to/not related to previous carpal tunnel and elbow surgery  C)Sepsis ruled out  D) Other ( Please specify condition)     PRESENT ON ADMISSION:  Was sepsis present on admission?  If so, please document.

## 2019-06-26 ENCOUNTER — RESULT REVIEW (OUTPATIENT)
Age: 79
End: 2019-06-26

## 2019-06-26 LAB
CHOLEST SERPL-MCNC: 163 MG/DL — SIGNIFICANT CHANGE UP (ref 10–199)
HDLC SERPL-MCNC: 59 MG/DL — SIGNIFICANT CHANGE UP
LIPID PNL WITH DIRECT LDL SERPL: 84 MG/DL — SIGNIFICANT CHANGE UP
TOTAL CHOLESTEROL/HDL RATIO MEASUREMENT: 2.8 RATIO — LOW (ref 3.4–9.6)
TRIGL SERPL-MCNC: 102 MG/DL — SIGNIFICANT CHANGE UP (ref 10–149)
VANCOMYCIN TROUGH SERPL-MCNC: 13.7 UG/ML — SIGNIFICANT CHANGE UP (ref 10–20)

## 2019-06-26 PROCEDURE — 88312 SPECIAL STAINS GROUP 1: CPT | Mod: 26

## 2019-06-26 PROCEDURE — 88313 SPECIAL STAINS GROUP 2: CPT | Mod: 26

## 2019-06-26 PROCEDURE — 88305 TISSUE EXAM BY PATHOLOGIST: CPT | Mod: 26

## 2019-06-26 PROCEDURE — 74181 MRI ABDOMEN W/O CONTRAST: CPT | Mod: 26

## 2019-06-26 RX ORDER — METOPROLOL TARTRATE 50 MG
12.5 TABLET ORAL DAILY
Refills: 0 | Status: DISCONTINUED | OUTPATIENT
Start: 2019-06-27 | End: 2019-06-28

## 2019-06-26 RX ORDER — MIRABEGRON 50 MG/1
25 TABLET, EXTENDED RELEASE ORAL
Refills: 0 | Status: DISCONTINUED | OUTPATIENT
Start: 2019-06-26 | End: 2019-06-28

## 2019-06-26 RX ADMIN — Medication 325 MILLIGRAM(S): at 17:59

## 2019-06-26 RX ADMIN — MIRTAZAPINE 15 MILLIGRAM(S): 45 TABLET, ORALLY DISINTEGRATING ORAL at 21:12

## 2019-06-26 RX ADMIN — Medication 5 MILLIGRAM(S): at 21:12

## 2019-06-26 RX ADMIN — PIPERACILLIN AND TAZOBACTAM 25 GRAM(S): 4; .5 INJECTION, POWDER, LYOPHILIZED, FOR SOLUTION INTRAVENOUS at 05:50

## 2019-06-26 RX ADMIN — PANTOPRAZOLE SODIUM 40 MILLIGRAM(S): 20 TABLET, DELAYED RELEASE ORAL at 05:47

## 2019-06-26 RX ADMIN — PIPERACILLIN AND TAZOBACTAM 25 GRAM(S): 4; .5 INJECTION, POWDER, LYOPHILIZED, FOR SOLUTION INTRAVENOUS at 17:58

## 2019-06-26 RX ADMIN — Medication 250 MILLIGRAM(S): at 10:39

## 2019-06-26 RX ADMIN — ATORVASTATIN CALCIUM 80 MILLIGRAM(S): 80 TABLET, FILM COATED ORAL at 21:12

## 2019-06-26 NOTE — PROGRESS NOTE ADULT - SUBJECTIVE AND OBJECTIVE BOX
CHIEF COMPLAINT:    HPI:  79 year old man with history of CAD- stents x2 on aspirin, HLD- recent surgery for carpal tunnel on the right hand and Elbow surgery with Dr Leblanc who presented to the ED with complaint of chest pain and abdominal pain. CP has been on and off for the past two weeks, he saw Dr Mazariegos- cardiac workup was done which was "ok" as per patient. He was prescribed Nitro PRN. Yesterday after lunch he started having epigastric abdominal pain and chills, denies fever. He had a recent abdominal sonogram at his PCP office and was told he has  gallstones. In the ED patient had a fever of 101.8 with leukocytosis. Chest CT showed RUL pnuemonia- patient was started on Vanco and zosyn. + elevated troponin. Pt had a coronary angiogram in Feb 2019 that showed normal coronary arteries and patent stents. He has had chest pain last week but on admission had epigastric pain and on exam epigastric tenderness. EKG showed sinus rhythm with PAC's. Trop elevated x3 but flat. LFT's normal x2. Amylase and lipase normal. Discussed getting HIDA scan with Dr. Pena and NP.    6/25: sinus rhythm on monitor, rate 60's with PAC's. Continue epigastric tenderness. HIDA scan today. IV Ab for PNA. Chest pain yesterday relieved by SL NTG, but as stated above cath in Feb showed normal coronaries.  If he has recurrent chest pain he will need another cath. GI saw pt and recommended EGD if HIDA scan normal.    6/26: HIDA negative for cholecytitis. Pt cleared for EGD today. Continued epigastric tenderness. No further chest pain. Sinus rhythm on monitor, rate 60's.    PMH- carpal tunnel syndrome  CAD, HLD  Heartburn  PSH-CAD with stents  Carpal tunnel surgery x2  elbow surgery  back surgery  FMH- heart disease  Father  Stroke- Mother  Social- does not smoke- drinks on occasions (24 Jun 2019 09:36)      PAST MEDICAL & SURGICAL HISTORY:  S/P coronary artery stent placement: lad x2  Hyperlipemia  Heartburn  Colon adenoma  CAD (coronary artery disease)  Status post tonsillectomy  History of repair of right rotator cuff  History of lumbar laminectomy      Allergies    clindamycin (Other)  morphine (Other)    Intolerances        Occupation:  [place]: Denied  Smoking: Denied  Drug Use: Denied  Marital Status:         FAMILY HISTORY:      REVIEW OF SYSTEMS:    CONSTITUTIONAL: No weakness, fevers or chills  EYES/ENT: No visual changes;  No vertigo or throat pain   NECK: No pain or stiffness  RESPIRATORY: No cough, wheezing, hemoptysis; No shortness of breath  CARDIOVASCULAR: No chest pain or palpitations  GASTROINTESTINAL: No abdominal or epigastric pain. No nausea, vomiting, or hematemesis; No diarrhea or constipation. No melena or hematochezia.  GENITOURINARY: No dysuria, frequency or hematuria  NEUROLOGICAL: No numbness or weakness  SKIN: No itching, burning, rashes, or lesions   All other review of systems is negative unless indicated above    Vital Signs Last 24 Hrs  T(C): 36.8 (24 Jun 2019 12:53), Max: 38.8 (23 Jun 2019 23:45)  T(F): 98.2 (24 Jun 2019 12:53), Max: 101.8 (23 Jun 2019 23:45)  HR: 63 (24 Jun 2019 12:53) (49 - 100)  BP: 132/73 (24 Jun 2019 12:53) (95/65 - 132/73)  BP(mean): --  RR: 18 (24 Jun 2019 12:53) (16 - 20)  SpO2: 98% (24 Jun 2019 12:53) (89% - 99%)    I&O's Summary      PHYSICAL EXAM:    Constitutional: NAD, awake and alert, well-developed  HEENT: PERR, EOMI,  No oral cyananosis.  Neck:  supple,  No JVD  Respiratory: Breath sounds are clear bilaterally, No wheezing, rales or rhonchi  Cardiovascular: S1 and S2, regular rate and rhythm, no Murmurs, gallops or rubs  Gastrointestinal: Bowel Sounds present, soft, nontender.   Extremities: No peripheral edema. No clubbing or cyanosis.  Vascular: 2+ peripheral pulses  Neurological: A/O x 3, no focal deficits  Musculoskeletal: no calf tenderness.  Skin: No rashes.    MEDICATIONS:  MEDICATIONS  (STANDING):  aspirin 325 milliGRAM(s) Oral daily  atorvastatin 80 milliGRAM(s) Oral at bedtime  melatonin 5 milliGRAM(s) Oral at bedtime  mirtazapine 15 milliGRAM(s) Oral at bedtime  pantoprazole    Tablet 40 milliGRAM(s) Oral before breakfast  piperacillin/tazobactam IVPB.. 3.375 Gram(s) IV Intermittent every 8 hours  vancomycin  IVPB 1000 milliGRAM(s) IV Intermittent every 12 hours      LABS: All Labs Reviewed:                        14.5   11.20 )-----------( 184      ( 24 Jun 2019 10:53 )             43.8                         14.7   13.50 )-----------( 201      ( 23 Jun 2019 23:32 )             43.2     24 Jun 2019 10:53    141    |  107    |  14     ----------------------------<  72     4.2     |  27     |  1.08   23 Jun 2019 23:32    137    |  102    |  18     ----------------------------<  115    4.1     |  25     |  1.13     Ca    8.3        24 Jun 2019 10:53  Ca    8.4        23 Jun 2019 23:32    TPro  6.7    /  Alb  3.0    /  TBili  0.8    /  DBili  x      /  AST  20     /  ALT  25     /  AlkPhos  68     24 Jun 2019 10:53  TPro  6.7    /  Alb  3.2    /  TBili  0.7    /  DBili  x      /  AST  19     /  ALT  27     /  AlkPhos  70     23 Jun 2019 23:32    PT/INR - ( 23 Jun 2019 23:32 )   PT: 12.1 sec;   INR: 1.09 ratio         PTT - ( 23 Jun 2019 23:32 )  PTT:30.9 sec  CARDIAC MARKERS ( 24 Jun 2019 10:53 )  0.124 ng/mL / x     / x     / x     / x      CARDIAC MARKERS ( 24 Jun 2019 05:23 )  0.163 ng/mL / x     / x     / x     / x      CARDIAC MARKERS ( 23 Jun 2019 23:32 )  0.168 ng/mL / x     / x     / x     / x          Blood Culture:         RADIOLOGY/EKG: CHIEF COMPLAINT:    HPI:  79 year old man with history of CAD- stents x2 on aspirin, HLD- recent surgery for carpal tunnel on the right hand and Elbow surgery with Dr Leblanc who presented to the ED with complaint of chest pain and abdominal pain. CP has been on and off for the past two weeks, he saw Dr Mazariegos- cardiac workup was done which was "ok" as per patient. He was prescribed Nitro PRN. Yesterday after lunch he started having epigastric abdominal pain and chills, denies fever. He had a recent abdominal sonogram at his PCP office and was told he has  gallstones. In the ED patient had a fever of 101.8 with leukocytosis. Chest CT showed RUL pnuemonia- patient was started on Vanco and zosyn. + elevated troponin. Pt had a coronary angiogram in Feb 2019 that showed normal coronary arteries and patent stents. He has had chest pain last week but on admission had epigastric pain and on exam epigastric tenderness. EKG showed sinus rhythm with PAC's. Trop elevated x3 but flat. LFT's normal x2. Amylase and lipase normal. Discussed getting HIDA scan with Dr. Pena and NP.    6/25: sinus rhythm on monitor, rate 60's with PAC's. Continue epigastric tenderness. HIDA scan today. IV Ab for PNA. Chest pain yesterday relieved by SL NTG, but as stated above cath in Feb showed normal coronaries.  If he has recurrent chest pain he will need another cath. GI saw pt and recommended EGD if HIDA scan normal.    6/26: HIDA negative for cholecytitis. Pt cleared for EGD today. Continued epigastric tenderness. No further chest pain. Sinus rhythm on monitor, rate 60's. BB D/Sampson in past probably due to bradycardia, but will restart low dose BB tomorrow.    PMH- carpal tunnel syndrome  CAD, HLD  Heartburn  PSH-CAD with stents  Carpal tunnel surgery x2  elbow surgery  back surgery  FMH- heart disease  Father  Stroke- Mother  Social- does not smoke- drinks on occasions (24 Jun 2019 09:36)      PAST MEDICAL & SURGICAL HISTORY:  S/P coronary artery stent placement: lad x2  Hyperlipemia  Heartburn  Colon adenoma  CAD (coronary artery disease)  Status post tonsillectomy  History of repair of right rotator cuff  History of lumbar laminectomy      Allergies    clindamycin (Other)  morphine (Other)    Intolerances        Occupation:  [place]: Denied  Smoking: Denied  Drug Use: Denied  Marital Status:         FAMILY HISTORY:      REVIEW OF SYSTEMS:    CONSTITUTIONAL: No weakness, fevers or chills  EYES/ENT: No visual changes;  No vertigo or throat pain   NECK: No pain or stiffness  RESPIRATORY: No cough, wheezing, hemoptysis; No shortness of breath  CARDIOVASCULAR: No chest pain or palpitations  GASTROINTESTINAL: No abdominal or epigastric pain. No nausea, vomiting, or hematemesis; No diarrhea or constipation. No melena or hematochezia.  GENITOURINARY: No dysuria, frequency or hematuria  NEUROLOGICAL: No numbness or weakness  SKIN: No itching, burning, rashes, or lesions   All other review of systems is negative unless indicated above    Vital Signs Last 24 Hrs  T(C): 36.8 (24 Jun 2019 12:53), Max: 38.8 (23 Jun 2019 23:45)  T(F): 98.2 (24 Jun 2019 12:53), Max: 101.8 (23 Jun 2019 23:45)  HR: 63 (24 Jun 2019 12:53) (49 - 100)  BP: 132/73 (24 Jun 2019 12:53) (95/65 - 132/73)  BP(mean): --  RR: 18 (24 Jun 2019 12:53) (16 - 20)  SpO2: 98% (24 Jun 2019 12:53) (89% - 99%)    I&O's Summary      PHYSICAL EXAM:    Constitutional: NAD, awake and alert, well-developed  HEENT: PERR, EOMI,  No oral cyananosis.  Neck:  supple,  No JVD  Respiratory: Breath sounds are clear bilaterally, No wheezing, rales or rhonchi  Cardiovascular: S1 and S2, regular rate and rhythm, no Murmurs, gallops or rubs  Gastrointestinal: Bowel Sounds present, soft, nontender.   Extremities: No peripheral edema. No clubbing or cyanosis.  Vascular: 2+ peripheral pulses  Neurological: A/O x 3, no focal deficits  Musculoskeletal: no calf tenderness.  Skin: No rashes.    MEDICATIONS:  MEDICATIONS  (STANDING):  aspirin 325 milliGRAM(s) Oral daily  atorvastatin 80 milliGRAM(s) Oral at bedtime  melatonin 5 milliGRAM(s) Oral at bedtime  mirtazapine 15 milliGRAM(s) Oral at bedtime  pantoprazole    Tablet 40 milliGRAM(s) Oral before breakfast  piperacillin/tazobactam IVPB.. 3.375 Gram(s) IV Intermittent every 8 hours  vancomycin  IVPB 1000 milliGRAM(s) IV Intermittent every 12 hours      LABS: All Labs Reviewed:                        14.5   11.20 )-----------( 184      ( 24 Jun 2019 10:53 )             43.8                         14.7   13.50 )-----------( 201      ( 23 Jun 2019 23:32 )             43.2     24 Jun 2019 10:53    141    |  107    |  14     ----------------------------<  72     4.2     |  27     |  1.08   23 Jun 2019 23:32    137    |  102    |  18     ----------------------------<  115    4.1     |  25     |  1.13     Ca    8.3        24 Jun 2019 10:53  Ca    8.4        23 Jun 2019 23:32    TPro  6.7    /  Alb  3.0    /  TBili  0.8    /  DBili  x      /  AST  20     /  ALT  25     /  AlkPhos  68     24 Jun 2019 10:53  TPro  6.7    /  Alb  3.2    /  TBili  0.7    /  DBili  x      /  AST  19     /  ALT  27     /  AlkPhos  70     23 Jun 2019 23:32    PT/INR - ( 23 Jun 2019 23:32 )   PT: 12.1 sec;   INR: 1.09 ratio         PTT - ( 23 Jun 2019 23:32 )  PTT:30.9 sec  CARDIAC MARKERS ( 24 Jun 2019 10:53 )  0.124 ng/mL / x     / x     / x     / x      CARDIAC MARKERS ( 24 Jun 2019 05:23 )  0.163 ng/mL / x     / x     / x     / x      CARDIAC MARKERS ( 23 Jun 2019 23:32 )  0.168 ng/mL / x     / x     / x     / x          Blood Culture:         RADIOLOGY/EKG:

## 2019-06-26 NOTE — PROGRESS NOTE ADULT - SUBJECTIVE AND OBJECTIVE BOX
c/c: epigastric pain    HPI:  79 year old man with history of CAD- stents x2 on aspirin, HLD- recent surgery for carpal tunnel on the right hand and Elbow surgery with Dr Leblanc who presented to the ED with complaint of chest pain and abdominal pain. CP has been on and off for the past two weeks, he saw Dr Mazariegos- cardiac workup was done which was "ok" as per patient. He was prescribed Nitro PRN. 6/23, after lunch he started having epigastric abdominal pain and chills, denies fever. He had a recent abdominal sonogram at his PCP office and was told he has  gallstones. In the ED patient had a fever of 101.8 with leukocytosis. Chest CT showed RUL pneumonia- patient was started on Vanco and zosyn. + elevated troponin- cardiology consulted. He was admitted for further management.   HIDA scan negative. Troponins mildly elevated.     6/26: pt seen and examined this am. Feels burning epigastric pain is somewhat better, but tender. No f/c/r. no n/v.      Review of system- All 10 systems reviewed and is as per HPI otherwise negative.     Vital Signs Last 24 Hrs  T(C): 37.3 (26 Jun 2019 12:12), Max: 37.3 (26 Jun 2019 12:12)  T(F): 99.2 (26 Jun 2019 12:12), Max: 99.2 (26 Jun 2019 12:12)  HR: 60 (26 Jun 2019 12:12) (60 - 64)  BP: 110/65 (26 Jun 2019 12:12) (110/65 - 117/72)  RR: 20 (26 Jun 2019 12:12) (18 - 20)  SpO2: 91% (26 Jun 2019 12:12) (91% - 94%)    PHYSICAL EXAM:    GENERAL: Comfortable, no acute distress  HEAD:  Atraumatic, Normocephalic  EYES: EOMI, PERRLA  HEENT: Moist mucous membranes  NECK: Supple, No JVD  NERVOUS SYSTEM:  Alert & Oriented X3, Motor Strength 5/5 B/L upper and lower extremities  CHEST/LUNG: Clear to auscultation bilaterally  HEART: Regular rate and rhythm; No murmurs, rubs, or gallops  ABDOMEN: Soft, epigastric tenderness+, no guarding/rigidity, Nondistended; Bowel sounds present  GENITOURINARY- Voiding, no palpable bladder  EXTREMITIES:  No clubbing, cyanosis, or edema  MUSCULOSKELETAL- No muscle tenderness, No joint tenderness  SKIN-no rash  LABS:                        14.3   10.24 )-----------( 176      ( 25 Jun 2019 07:34 )             42.9     06-25    135  |  103  |  14  ----------------------------<  127<H>  3.7   |  24  |  1.10    Ca    8.6      25 Jun 2019 07:34    MEDICATIONS  (STANDING):  aspirin 325 milliGRAM(s) Oral daily  atorvastatin 80 milliGRAM(s) Oral at bedtime  melatonin 5 milliGRAM(s) Oral at bedtime  mirtazapine 15 milliGRAM(s) Oral at bedtime  pantoprazole    Tablet 40 milliGRAM(s) Oral before breakfast  piperacillin/tazobactam IVPB.. 3.375 Gram(s) IV Intermittent every 8 hours  vancomycin  IVPB 1000 milliGRAM(s) IV Intermittent every 12 hours    MEDICATIONS  (PRN):  acetaminophen   Tablet .. 650 milliGRAM(s) Oral once PRN Temp greater or equal to 38C (100.4F)  nitroglycerin     SubLingual 0.4 milliGRAM(s) SubLingual every 5 minutes PRN Chest Pain  ondansetron Injectable 4 milliGRAM(s) IV Push every 6 hours PRN Nausea    ASSESSMENT AND PLAN:  79m, PMH as above a/w:    1. Pneumonia/HCAP with concern for resistant gnr and/or gram positive organism.   -ID eval appreciated.   -continue iv abx.  -f/u cultures.  -NO SEPSIS.     2.  Epigastric pain:  -?related to gastritis.   - ppi  - gallstone pancreatitis ruled out  - HIDA negative.   - GI eval noted-->EGD today    3. h/o CAD with elevated troponins/intermittent chest pain:  -cardio eval appreciated, may need angiogram for further evaluation.     4. HLD:  -statin.     5. DVT px  -ambulate

## 2019-06-27 LAB — TROPONIN I SERPL-MCNC: 0.03 NG/ML — SIGNIFICANT CHANGE UP (ref 0.01–0.04)

## 2019-06-27 PROCEDURE — 93010 ELECTROCARDIOGRAM REPORT: CPT

## 2019-06-27 RX ADMIN — Medication 325 MILLIGRAM(S): at 06:28

## 2019-06-27 RX ADMIN — PIPERACILLIN AND TAZOBACTAM 25 GRAM(S): 4; .5 INJECTION, POWDER, LYOPHILIZED, FOR SOLUTION INTRAVENOUS at 01:30

## 2019-06-27 RX ADMIN — Medication 250 MILLIGRAM(S): at 00:13

## 2019-06-27 RX ADMIN — PANTOPRAZOLE SODIUM 40 MILLIGRAM(S): 20 TABLET, DELAYED RELEASE ORAL at 06:11

## 2019-06-27 RX ADMIN — Medication 5 MILLIGRAM(S): at 21:42

## 2019-06-27 RX ADMIN — Medication 250 MILLIGRAM(S): at 06:11

## 2019-06-27 RX ADMIN — MIRABEGRON 25 MILLIGRAM(S): 50 TABLET, EXTENDED RELEASE ORAL at 06:10

## 2019-06-27 RX ADMIN — MIRTAZAPINE 15 MILLIGRAM(S): 45 TABLET, ORALLY DISINTEGRATING ORAL at 21:42

## 2019-06-27 RX ADMIN — Medication 12.5 MILLIGRAM(S): at 06:10

## 2019-06-27 RX ADMIN — PIPERACILLIN AND TAZOBACTAM 25 GRAM(S): 4; .5 INJECTION, POWDER, LYOPHILIZED, FOR SOLUTION INTRAVENOUS at 09:10

## 2019-06-27 RX ADMIN — ATORVASTATIN CALCIUM 80 MILLIGRAM(S): 80 TABLET, FILM COATED ORAL at 21:42

## 2019-06-27 NOTE — PROGRESS NOTE ADULT - SUBJECTIVE AND OBJECTIVE BOX
CHIEF COMPLAINT:    HPI:  79 year old man with history of CAD- stents x2 on aspirin, HLD- recent surgery for carpal tunnel on the right hand and Elbow surgery with Dr Leblanc who presented to the ED with complaint of chest pain and abdominal pain. CP has been on and off for the past two weeks, he saw Dr Mazariegos- cardiac workup was done which was "ok" as per patient. He was prescribed Nitro PRN. Yesterday after lunch he started having epigastric abdominal pain and chills, denies fever. He had a recent abdominal sonogram at his PCP office and was told he has  gallstones. In the ED patient had a fever of 101.8 with leukocytosis. Chest CT showed RUL pnuemonia- patient was started on Vanco and zosyn. + elevated troponin. Pt had a coronary angiogram in Feb 2019 that showed normal coronary arteries and patent stents. He has had chest pain last week but on admission had epigastric pain and on exam epigastric tenderness. EKG showed sinus rhythm with PAC's. Trop elevated x3 but flat. LFT's normal x2. Amylase and lipase normal. Discussed getting HIDA scan with Dr. Pena and NP.    6/25: sinus rhythm on monitor, rate 60's with PAC's. Continue epigastric tenderness. HIDA scan today. IV Ab for PNA. Chest pain yesterday relieved by SL NTG, but as stated above cath in Feb showed normal coronaries.  If he has recurrent chest pain he will need another cath. GI saw pt and recommended EGD if HIDA scan normal.    6/26: HIDA negative for cholecytitis. Pt cleared for EGD today. Continued epigastric tenderness. No further chest pain. Sinus rhythm on monitor, rate 60's. BB D/Sampson in past probably due to bradycardia, but will restart low dose BB tomorrow.    6/27: EGD did not show esophagitis. Few antral erosions. MRCP showed gallstones but no choledocholithiasis. Pt had an episode of chest pain this morning lasting about 1 hr with no EKG changes.  Will get cardiac cath today by Dr. Rodriguez, who I spoke to. Keep NPO.    PMH- carpal tunnel syndrome  CAD, HLD  Heartburn  PSH-CAD with stents  Carpal tunnel surgery x2  elbow surgery  back surgery  FMH- heart disease  Father  Stroke- Mother  Social- does not smoke- drinks on occasions (24 Jun 2019 09:36)      PAST MEDICAL & SURGICAL HISTORY:  S/P coronary artery stent placement: lad x2  Hyperlipemia  Heartburn  Colon adenoma  CAD (coronary artery disease)  Status post tonsillectomy  History of repair of right rotator cuff  History of lumbar laminectomy      Allergies    clindamycin (Other)  morphine (Other)    Intolerances        Occupation:  AlZeelol: Denied  Smoking: Denied  Drug Use: Denied  Marital Status:         FAMILY HISTORY:      REVIEW OF SYSTEMS:    CONSTITUTIONAL: No weakness, fevers or chills  EYES/ENT: No visual changes;  No vertigo or throat pain   NECK: No pain or stiffness  RESPIRATORY: No cough, wheezing, hemoptysis; No shortness of breath  CARDIOVASCULAR: No chest pain or palpitations  GASTROINTESTINAL: No abdominal or epigastric pain. No nausea, vomiting, or hematemesis; No diarrhea or constipation. No melena or hematochezia.  GENITOURINARY: No dysuria, frequency or hematuria  NEUROLOGICAL: No numbness or weakness  SKIN: No itching, burning, rashes, or lesions   All other review of systems is negative unless indicated above    Vital Signs Last 24 Hrs  T(C): 36.8 (24 Jun 2019 12:53), Max: 38.8 (23 Jun 2019 23:45)  T(F): 98.2 (24 Jun 2019 12:53), Max: 101.8 (23 Jun 2019 23:45)  HR: 63 (24 Jun 2019 12:53) (49 - 100)  BP: 132/73 (24 Jun 2019 12:53) (95/65 - 132/73)  BP(mean): --  RR: 18 (24 Jun 2019 12:53) (16 - 20)  SpO2: 98% (24 Jun 2019 12:53) (89% - 99%)    I&O's Summary      PHYSICAL EXAM:    Constitutional: NAD, awake and alert, well-developed  HEENT: PERR, EOMI,  No oral cyananosis.  Neck:  supple,  No JVD  Respiratory: Breath sounds are clear bilaterally, No wheezing, rales or rhonchi  Cardiovascular: S1 and S2, regular rate and rhythm, no Murmurs, gallops or rubs  Gastrointestinal: Bowel Sounds present, soft, nontender.   Extremities: No peripheral edema. No clubbing or cyanosis.  Vascular: 2+ peripheral pulses  Neurological: A/O x 3, no focal deficits  Musculoskeletal: no calf tenderness.  Skin: No rashes.    MEDICATIONS:  MEDICATIONS  (STANDING):  aspirin 325 milliGRAM(s) Oral daily  atorvastatin 80 milliGRAM(s) Oral at bedtime  melatonin 5 milliGRAM(s) Oral at bedtime  mirtazapine 15 milliGRAM(s) Oral at bedtime  pantoprazole    Tablet 40 milliGRAM(s) Oral before breakfast  piperacillin/tazobactam IVPB.. 3.375 Gram(s) IV Intermittent every 8 hours  vancomycin  IVPB 1000 milliGRAM(s) IV Intermittent every 12 hours      LABS: All Labs Reviewed:                        14.5   11.20 )-----------( 184      ( 24 Jun 2019 10:53 )             43.8                         14.7   13.50 )-----------( 201      ( 23 Jun 2019 23:32 )             43.2     24 Jun 2019 10:53    141    |  107    |  14     ----------------------------<  72     4.2     |  27     |  1.08   23 Jun 2019 23:32    137    |  102    |  18     ----------------------------<  115    4.1     |  25     |  1.13     Ca    8.3        24 Jun 2019 10:53  Ca    8.4        23 Jun 2019 23:32    TPro  6.7    /  Alb  3.0    /  TBili  0.8    /  DBili  x      /  AST  20     /  ALT  25     /  AlkPhos  68     24 Jun 2019 10:53  TPro  6.7    /  Alb  3.2    /  TBili  0.7    /  DBili  x      /  AST  19     /  ALT  27     /  AlkPhos  70     23 Jun 2019 23:32    PT/INR - ( 23 Jun 2019 23:32 )   PT: 12.1 sec;   INR: 1.09 ratio         PTT - ( 23 Jun 2019 23:32 )  PTT:30.9 sec  CARDIAC MARKERS ( 24 Jun 2019 10:53 )  0.124 ng/mL / x     / x     / x     / x      CARDIAC MARKERS ( 24 Jun 2019 05:23 )  0.163 ng/mL / x     / x     / x     / x      CARDIAC MARKERS ( 23 Jun 2019 23:32 )  0.168 ng/mL / x     / x     / x     / x          Blood Culture:         RADIOLOGY/EKG:

## 2019-06-27 NOTE — PACU DISCHARGE NOTE - COMMENTS
report given to Estrellita HOBSON.  pt placed on tele.  tele confirmed by Shilpa. post cath care renedered

## 2019-06-27 NOTE — PROGRESS NOTE ADULT - SUBJECTIVE AND OBJECTIVE BOX
GI-coverage for Dr. Otero    sleeping deeply with mask still on   MRCP negative  plan for cath noted  no events reported except for CP overnight    ROS: negative for cough  all systems reviewed and otherwise negative    PHYSICAL EXAM:  Vital Signs Last 24 Hrs  T(C): 36.7 (27 Jun 2019 05:53), Max: 37.7 (26 Jun 2019 20:37)  T(F): 98 (27 Jun 2019 05:53), Max: 99.9 (26 Jun 2019 20:37)  HR: 63 (27 Jun 2019 06:09) (58 - 108)  BP: 109/64 (27 Jun 2019 05:53) (109/64 - 132/67)  BP(mean): --  RR: 18 (27 Jun 2019 05:53) (18 - 20)  SpO2: 93% (27 Jun 2019 05:53) (91% - 94%)    Constitutional: No acute distress, sleeping    Eyes: Anicteric    Neck: Supple, no JVD    Respiratory: Clear to auscultation bilaterally, breathing comfortably on NC    Cardiovascular: Regular rate and rhythm, no murmur    Gastrointestinal: Soft. no tenderness, nondistended, bowel sounds present.  No mass.  No hepatosplenomegaly. No palpable hernia.      Extremities: Warm, well-perfused, no edema    Neurological: Grossly intact, no focal deficits    Skin: No lesion or rash    Lymph Nodes: No cervical or supraclavicular lymphadenopathy

## 2019-06-27 NOTE — PROGRESS NOTE ADULT - SUBJECTIVE AND OBJECTIVE BOX
c/c: epigastric pain    HPI:  79 year old man with history of CAD- stents x2 on aspirin, HLD- recent surgery for carpal tunnel on the right hand and Elbow surgery with Dr Leblanc who presented to the ED with complaint of chest pain and abdominal pain. CP has been on and off for the past two weeks, he saw Dr Mazariegos- cardiac workup was done which was "ok" as per patient. He was prescribed Nitro PRN. 6/23, after lunch he started having epigastric abdominal pain and chills, denies fever. He had a recent abdominal sonogram at his PCP office and was told he has  gallstones. In the ED patient had a fever of 101.8 with leukocytosis. Chest CT showed RUL pneumonia- patient was started on Vanco and zosyn. + elevated troponin- cardiology consulted. He was admitted for further management.   HIDA scan negative. Troponins mildly elevated.     6/26: pt seen and examined this am. Feels burning epigastric pain is somewhat better, but tender. No f/c/r. no n/v.  6/27 for [facility] today          PHYSICAL EXAM:    GENERAL: Comfortable, no acute distress  HEAD:  Atraumatic, Normocephalic  EYES: EOMI, PERRLA  HEENT: Moist mucous membranes  NECK: Supple, No JVD  NERVOUS SYSTEM:  Alert & Oriented X3, Motor Strength 5/5 B/L upper and lower extremities  CHEST/LUNG: Clear to auscultation bilaterally  HEART: Regular rate and rhythm; No murmurs, rubs, or gallops  ABDOMEN: Soft, epigastric tenderness+, no guarding/rigidity, Nondistended; Bowel sounds present  GENITOURINARY- Voiding, no palpable bladder  EXTREMITIES:  No clubbing, cyanosis, or edema  MUSCULOSKELETAL- No muscle tenderness, No joint tenderness  SKIN-no rash  LABS:                        14.3   10.24 )-----------( 176      ( 25 Jun 2019 07:34 )             42.9     06-25    135  |  103  |  14  ----------------------------<  127<H>  3.7   |  24  |  1.10    Ca    8.6      25 Jun 2019 07:34          ASSESSMENT AND PLAN:  79m, PMH as above a/w:    1. Pneumonia/HCAP with concern for resistant gnr and/or gram positive organism.   - resolved plan to dc abx    2.  Epigastric pain:  -?related to gastritis.   - ppi  - gallstone pancreatitis ruled out  - HIDA negative.       3. h/o CAD with elevated troponins/intermittent chest pain:  -cardio eval LHC today  4. HLD:  -statin.     5. DVT px  -ambulate

## 2019-06-27 NOTE — CHART NOTE - NSCHARTNOTEFT_GEN_A_CORE
Notified by RN, pt complaining of CP.   Pt was seen and examined. Reports midsternal Co and jaw pain. Reports they appear to him as separate events occurring at same time. Describes CP as sharp and pressure pain, [id number], 8/10, intermittent. Denies any aggravating or relieving factors. Denies SOB, palpitations. Reports abdominal pain since admission. Pt reports pain is currenlty resolving.    Vital Signs Last 24 Hrs  T(C): 36.7 (27 Jun 2019 05:53), Max: 37.7 (26 Jun 2019 20:37)  T(F): 98 (27 Jun 2019 05:53), Max: 99.9 (26 Jun 2019 20:37)  HR: 63 (27 Jun 2019 06:09) (58 - 108)  BP: 109/64 (27 Jun 2019 05:53) (109/64 - 132/67)  RR: 18 (27 Jun 2019 05:53) (18 - 20)  SpO2: 93% (27 Jun 2019 05:53) (91% - 94%)    Gen: NAD  Pulm: cta b/l  Cardio: RRR  Abdo: soft, tender  Neuro: A/O x3  Vasc: radial pulses present    A/P:  Chest Pain r/o ACS  - EKG:  - Asa given  - F/u trop x2  - Cardio following  - Pending MRI results for abdominal pain Notified by RN, pt complaining of CP.   Pt was seen and examined. Reports midsternal Co and jaw pain. Reports they appear to him as separate events occurring at same time. Describes CP as sharp and pressure pain, [id number], 8/10, intermittent. Denies any aggravating or relieving factors. Denies SOB, palpitations. Reports abdominal pain since admission. Pt reports pain is currently resolving.    Vital Signs Last 24 Hrs  T(C): 36.7 (27 Jun 2019 05:53), Max: 37.7 (26 Jun 2019 20:37)  T(F): 98 (27 Jun 2019 05:53), Max: 99.9 (26 Jun 2019 20:37)  HR: 63 (27 Jun 2019 06:09) (58 - 108)  BP: 109/64 (27 Jun 2019 05:53) (109/64 - 132/67)  RR: 18 (27 Jun 2019 05:53) (18 - 20)  SpO2: 93% (27 Jun 2019 05:53) (91% - 94%)    Gen: NAD  Pulm: cta b/l  Cardio: RRR  Abdo: soft, tender  Neuro: A/O x3  Vasc: radial pulses present    A/P:  Chest Pain r/o ACS  - EKG: NSR. NO evidence of ST changes.   - Asa given  - F/u trop x2  - Cardio following  - Pending MRI results for abdominal pain    d/w Dr. Azar MD, Dr. Whitehead, PGY3

## 2019-06-27 NOTE — PROGRESS NOTE ADULT - SUBJECTIVE AND OBJECTIVE BOX
Patient is a 79y old  Male who presents with a chief complaint of abdominal pain and chest pain (24 Jun 2019 09:36)    Date of service: 06-27-19 @ 10:37      Patient had chest pain earlier that radiated to jaw; is awaiting cardiac cath      ROS unable to obtain secondary to patient medical condition     MEDICATIONS  (STANDING):  aspirin 325 milliGRAM(s) Oral daily  atorvastatin 80 milliGRAM(s) Oral at bedtime  melatonin 5 milliGRAM(s) Oral at bedtime  metoprolol succinate ER 12.5 milliGRAM(s) Oral daily  mirabegron ER 25 milliGRAM(s) Oral <User Schedule>  mirtazapine 15 milliGRAM(s) Oral at bedtime  pantoprazole    Tablet 40 milliGRAM(s) Oral before breakfast    MEDICATIONS  (PRN):  acetaminophen   Tablet .. 650 milliGRAM(s) Oral once PRN Temp greater or equal to 38C (100.4F)  nitroglycerin     SubLingual 0.4 milliGRAM(s) SubLingual every 5 minutes PRN Chest Pain  ondansetron Injectable 4 milliGRAM(s) IV Push every 6 hours PRN Nausea      Vital Signs Last 24 Hrs  T(C): 36.7 (27 Jun 2019 05:53), Max: 37.7 (26 Jun 2019 20:37)  T(F): 98 (27 Jun 2019 05:53), Max: 99.9 (26 Jun 2019 20:37)  HR: 63 (27 Jun 2019 06:09) (58 - 108)  BP: 109/64 (27 Jun 2019 05:53) (109/64 - 132/67)  BP(mean): --  RR: 18 (27 Jun 2019 05:53) (18 - 20)  SpO2: 93% (27 Jun 2019 05:53) (91% - 94%)    Physical Exam:          Constitutional: frail looking  HEENT: NC/AT, EOMI, PERRLA, conjunctivae clear; ears and nose atraumatic; pharynx clear  Neck: supple; thyroid not palpable  Back: no tenderness  Respiratory: respiratory effort normal; clear to auscultation  Cardiovascular: S1S2 regular, no murmurs  Abdomen: soft, not tender, not distended, positive BS; no liver or spleen organomegaly  Genitourinary: no suprapubic tenderness  Musculoskeletal: no muscle tenderness, no joint swelling or tenderness  Neurological/ Psychiatric: AxOx3, judgement and insight normal;  moving all extremities  Skin: no rashes; no palpable lesions    Labs: all available labs reviewed                       Labs:                       Labs:             Vancomycin Level, Trough: 13.7 ug/mL (06-26 @ 05:17)      Cultures:       Culture - Urine (collected 06-24-19 @ 02:01)  Source: .Urine None  Final Report (06-25-19 @ 07:32):    No growth    Culture - Blood (collected 06-23-19 @ 23:32)  Source: .Blood Blood-Peripheral  Preliminary Report (06-25-19 @ 09:01):    No growth to date.    Culture - Blood (collected 06-23-19 @ 23:32)  Source: .Blood Blood-Peripheral  Preliminary Report (06-25-19 @ 09:01):    No growth to date.        < from: NM Hepatobiliary Imaging (06.25.19 @ 11:27) >    EXAM:  NM HEPATOBILIARY IMG                            PROCEDURE DATE:  06/25/2019          INTERPRETATION:    RADIOPHARMACEUTICAL:  99mTc-Mebrofenin  DOSE: 3.4 mCi IV    CLINICAL INFORMATION: 79 year old male with right upper quadrant   abdominal pain and cholelithiasis, referred to evaluate for acute   cholecystitis    TECHNIQUE: Dynamic images of the anterior abdomen were obtained for 90   minutes immediately following radiotracer injection. Static images of the   abdomen in the anterior, right anterior oblique and right lateral   projections were also obtained.    COMPARISON:  No previous hepatobiliary scan for comparison    FINDINGS: There is prompt uptake of the injected radiotracer by the   hepatocytes. The gallbladder is first visualized at 10 minutes post   tracer injection and bowel activity by 65 minutes. There is normal tracer   clearance from the liver by the end of the study.     IMPRESSION: Normal hepatobiliary scan.     No scan evidence of acute cholecystitis.       < end of copied text >      < from: CT Chest w/ IV Cont (06.24.19 @ 02:36) >    EXAM:  CT CHEST IC                          EXAM:  CT ABDOMEN AND PELVIS IC                            PROCEDURE DATE:  06/24/2019          INTERPRETATION:  CLINICAL INFORMATION: Fever, hypoxia and abdominal pain.    TECHNIQUE: Contrast-enhanced axial images of the chest, abdomen and   pelvis were obtained with coronal and sagittal reformats. 90 cc Omnipaque   350 were administered intravenously and 10 cc were discarded.    COMPARISON: CT angiogram chest and abdomen 5/20/2013.    FINDINGS:   CHEST:  Lungs and airways: The trachea and main bronchi are patent.  Patchy   opacity in the right upper lobe. Dependent bilateral lower lobe   atelectasis. There is no pleural effusion or pneumothorax.     Heart and vessels: The heart size is normal. Coronary artery   calcifications. There is no pericardial effusion. The thoracic aorta and   main pulmonary artery are normal caliber.     Mediastinum and soft tissues: The thyroid gland is unremarkable. There is   no axillary, mediastinal or hilar adenopathy.     Bones: No acute abnormality. Spinal degenerative changes and posterior   thoracolumbar spinal fixation with bilateral transpedicular screws and   interconnecting rods in T12.    ABDOMEN AND PELVIS:  Hepatobiliary: Tiny gallstones in the gallbladder. Mural nodularity at   the fundus consistent with adenomyomatosis. No CT signs of cholecystitis.   No biliary ductal dilation. Liver within normal limits.  Pancreas: Within normal limits.  Spleen: Within normal limits.  Adrenals: Within normal limits.    Kidneys/Ureters/Bladder: Symmetric nephrographic phase of renal   enhancement. Bilateral extrarenal pelves and left renal peripelvic cysts.   No hydronephrosis. Distended urinary bladder with secondary fullness of   the renal collectingsystem bilaterally.  Reproductive Organs: Mild prostatomegaly measuring 5 x 4 x 5.6 cm with   nodular indentation of the bladder base.    Bowel/Peritoneum: No bowel obstruction, inflammation or pneumoperitoneum.    Visualized appendiceal base is normal.  Portions of the gastrointestinal   tract are collapsed, limiting evaluation.    Vessels:  Mild calcific atherosclerosis. No abdominal aortic aneurysm.  Lymphatics/Retroperitoneum: No lymphadenopathy. No retroperitoneal   hematoma.      Soft Tissues: Within normal limits.  Bones: Generalized osteopenia limits evaluation of the lumbar spine.   Transitional lumbosacral anatomy with apparent lumbarized S1. Possible   fusion of the mid lumbar vertebral bodies. Laminectomy and posterior   spinal fusion with bilateral transpedicular screws and interconnecting   rods from T12 to S1. Hardware appears intact without periprosthetic   lucency. Streak artifact from the hardware limits evaluation of the   surrounding structures.  Destructive L5-S1 endplate changes with metallic   intervertebral disc space density, possibly a disc spacer, probably   chronic as presence of vacuum disc change makes acute discitis less   likely.    IMPRESSION:  CT Chest: Right upper lobe opacity which is nonspecific and may reflect   pneumonia or other infiltrate. Follow-up chest CT in 6 weeks recommended   to ensure resolution.    CT Abdomen and Pelvis: Distended urinary bladder with secondary fullness   of the renal collecting system bilaterally. No discernible acute bowel   inflammation.    Additional incidental findings as described in the body of the report.        < end of copied text >      Radiology: all available radiological tests reviewed    Advanced directives addressed: full resuscitation

## 2019-06-27 NOTE — PROGRESS NOTE ADULT - SUBJECTIVE AND OBJECTIVE BOX
Cath Lab Nurse Practitioner Note  HPI:  79 year old man with history of CAD- stents x2 on aspirin, HLD- recent surgery for carpal tunnel on the right hand and Elbow surgery with Dr Leblanc who presented to the ED with complaint of chest pain and abdominal pain. CP has been on and off for the past two weeks, he saw Dr Mazariegos- cardiac workup was done which was "ok" as per patient. He was prescribed Nitro PRN. Yesterday after lunch he started having epigastric abdominal pain and chills, denies fever. He had a recent abdominal sonogram at his PCP office and was told he has  gallstones. In the ED patient had a fever of 101.8 with leukocytosis. Chest CT showed RUL pnuemonia- patient was started on Vanco and zosyn. + elevated troponin- cardiology consulted & pt referred for LHC/possible intervention.      s/p LHC : patent stent, non obstructive CAD  Pt denies chest pain/SOB/palpitations post cath.    Physical exam:  Vital Signs: /70 HR 65 RR 16  Cardiac : (+)S1S2, RRR  Lungs: CTA B/L  Abd: soft, NT, (+)BS  Ext: Rt groin (+) perclose closure device , 2+ Rt DP    MEDICATIONS  (STANDING):  aspirin 325 milliGRAM(s) Oral daily  atorvastatin 80 milliGRAM(s) Oral at bedtime  melatonin 5 milliGRAM(s) Oral at bedtime  metoprolol succinate ER 12.5 milliGRAM(s) Oral daily  mirabegron ER 25 milliGRAM(s) Oral <User Schedule>  mirtazapine 15 milliGRAM(s) Oral at bedtime  pantoprazole    Tablet 40 milliGRAM(s) Oral before breakfast      A/P : s/p LHC: patent stent, nonobstructive CAD  -encourage po hydration  -medical Mx for CAD  -continue under hospitalist care  -Plan discussed with pt/Dr. Rodriguez/Dr. Mcginnis.

## 2019-06-28 ENCOUNTER — TRANSCRIPTION ENCOUNTER (OUTPATIENT)
Age: 79
End: 2019-06-28

## 2019-06-28 VITALS
OXYGEN SATURATION: 91 % | DIASTOLIC BLOOD PRESSURE: 64 MMHG | TEMPERATURE: 98 F | HEART RATE: 56 BPM | RESPIRATION RATE: 17 BRPM | SYSTOLIC BLOOD PRESSURE: 100 MMHG

## 2019-06-28 LAB — SURGICAL PATHOLOGY STUDY: SIGNIFICANT CHANGE UP

## 2019-06-28 RX ADMIN — Medication 325 MILLIGRAM(S): at 12:51

## 2019-06-28 RX ADMIN — MIRABEGRON 25 MILLIGRAM(S): 50 TABLET, EXTENDED RELEASE ORAL at 06:20

## 2019-06-28 RX ADMIN — PANTOPRAZOLE SODIUM 40 MILLIGRAM(S): 20 TABLET, DELAYED RELEASE ORAL at 06:20

## 2019-06-28 NOTE — DISCHARGE NOTE PROVIDER - HOSPITAL COURSE
79 year old man with history of CAD- stents x2 on aspirin, HLD- recent surgery for carpal tunnel on the right hand and Elbow surgery with Dr Leblanc who presented to the ED with complaint of chest pain and abdominal pain. CP has been on and off for the past two weeks, he saw Dr Mazariegos- cardiac workup was done which was "ok" as per patient. He was prescribed Nitro PRN. Yesterday after lunch he started having epigastric abdominal pain and chills, denies fever. He had a recent abdominal sonogram at his PCP office and was told he has  gallstones. In the ED patient had a fever of 101.8 with leukocytosis. Chest CT showed RUL pnuemonia- patient was started on Vanco and zosyn. + elevated troponin- cardiology consulted.          Problem/Plan - 1:    ·  Problem: Epigastric pain.  Plan: r/o AC/ gallstone  Pancreatitis    CT abdomen showed tiny gallstones in the gallbladder    Negative murphys sign    recheck Liver panel    CT also showed increase prostate size- patient aware-  will need outpatient f/u with urologist    SOnogram showed - Cholelithiasis without sonographic signs of cholecystitis. Fatty liver and hepatomegaly.          Problem/Plan - 2:    ·  Problem: Pneumonia of right upper lobe due to infectious organism.                   Problem/Plan - 3:    ·  Problem: Coronary artery disease without angina pectoris, continue BB and ASA. Angiogram as above. Negative coronary angiogram.

## 2019-06-28 NOTE — DISCHARGE NOTE PROVIDER - NSDCCPCAREPLAN_GEN_ALL_CORE_FT
PRINCIPAL DISCHARGE DIAGNOSIS  Diagnosis: Pneumonia  Assessment and Plan of Treatment: resolved      SECONDARY DISCHARGE DIAGNOSES  Diagnosis: Chest pain  Assessment and Plan of Treatment:

## 2019-06-28 NOTE — DISCHARGE NOTE NURSING/CASE MANAGEMENT/SOCIAL WORK - NSDCDPATPORTLINK_GEN_ALL_CORE
You can access the [facility] Patient Portal, offered by [facility], by registering with the following website: [email or web address]

## 2019-06-28 NOTE — PROGRESS NOTE ADULT - SUBJECTIVE AND OBJECTIVE BOX
CHIEF COMPLAINT:    HPI:  79 year old man with history of CAD- stents x2 on aspirin, HLD- recent surgery for carpal tunnel on the right hand and Elbow surgery with Dr Leblanc who presented to the ED with complaint of chest pain and abdominal pain. CP has been on and off for the past two weeks, he saw Dr Mazariegos- cardiac workup was done which was "ok" as per patient. He was prescribed Nitro PRN. Yesterday after lunch he started having epigastric abdominal pain and chills, denies fever. He had a recent abdominal sonogram at his PCP office and was told he has  gallstones. In the ED patient had a fever of 101.8 with leukocytosis. Chest CT showed RUL pnuemonia- patient was started on Vanco and zosyn. + elevated troponin. Pt had a coronary angiogram in Feb 2019 that showed normal coronary arteries and patent stents. He has had chest pain last week but on admission had epigastric pain and on exam epigastric tenderness. EKG showed sinus rhythm with PAC's. Trop elevated x3 but flat. LFT's normal x2. Amylase and lipase normal. Discussed getting HIDA scan with Dr. Pena and NP.    6/25: sinus rhythm on monitor, rate 60's with PAC's. Continue epigastric tenderness. HIDA scan today. IV Ab for PNA. Chest pain yesterday relieved by SL NTG, but as stated above cath in Feb showed normal coronaries.  If he has recurrent chest pain he will need another cath. GI saw pt and recommended EGD if HIDA scan normal.    6/26: HIDA negative for cholecytitis. Pt cleared for EGD today. Continued epigastric tenderness. No further chest pain. Sinus rhythm on monitor, rate 60's. BB D/Sampson in past probably due to bradycardia, but will restart low dose BB tomorrow.    6/27: EGD did not show esophagitis. Few antral erosions. MRCP showed gallstones but no choledocholithiasis. Pt had an episode of chest pain this morning lasting about 1 hr with no EKG changes.  Will get cardiac cath today by Dr. Rodriguez, who I spoke to. Keep NPO.    6/28: cath showed patent LAD stents, and 30% distal stenosis. R femoral pulse normal. Cardiac status stable for D/C. EGD BXes pending. Continue Protonix and F/U with GI. Chest pain due to esophageal spasm. No indication for lap cholecystectomy at present. F/U with me next week. Contiue Ab for PNA.    PMH- carpal tunnel syndrome  CAD, HLD  Heartburn  PSH-CAD with stents  Carpal tunnel surgery x2  elbow surgery  back surgery  FMH- heart disease  Father  Stroke- Mother  Social- does not smoke- drinks on occasions (24 Jun 2019 09:36)      PAST MEDICAL & SURGICAL HISTORY:  S/P coronary artery stent placement: lad x2  Hyperlipemia  Heartburn  Colon adenoma  CAD (coronary artery disease)  Status post tonsillectomy  History of repair of right rotator cuff  History of lumbar laminectomy      Allergies    clindamycin (Other)  morphine (Other)    Intolerances        Occupation:  Alochol: Denied  Smoking: Denied  Drug Use: Denied  Marital Status:         FAMILY HISTORY:      REVIEW OF SYSTEMS:    CONSTITUTIONAL: No weakness, fevers or chills  EYES/ENT: No visual changes;  No vertigo or throat pain   NECK: No pain or stiffness  RESPIRATORY: No cough, wheezing, hemoptysis; No shortness of breath  CARDIOVASCULAR: No chest pain or palpitations  GASTROINTESTINAL: No abdominal or epigastric pain. No nausea, vomiting, or hematemesis; No diarrhea or constipation. No melena or hematochezia.  GENITOURINARY: No dysuria, frequency or hematuria  NEUROLOGICAL: No numbness or weakness  SKIN: No itching, burning, rashes, or lesions   All other review of systems is negative unless indicated above    Vital Signs Last 24 Hrs  T(C): 36.8 (24 Jun 2019 12:53), Max: 38.8 (23 Jun 2019 23:45)  T(F): 98.2 (24 Jun 2019 12:53), Max: 101.8 (23 Jun 2019 23:45)  HR: 63 (24 Jun 2019 12:53) (49 - 100)  BP: 132/73 (24 Jun 2019 12:53) (95/65 - 132/73)  BP(mean): --  RR: 18 (24 Jun 2019 12:53) (16 - 20)  SpO2: 98% (24 Jun 2019 12:53) (89% - 99%)    I&O's Summary      PHYSICAL EXAM:    Constitutional: NAD, awake and alert, well-developed  HEENT: PERR, EOMI,  No oral cyananosis.  Neck:  supple,  No JVD  Respiratory: Breath sounds are clear bilaterally, No wheezing, rales or rhonchi  Cardiovascular: S1 and S2, regular rate and rhythm, no Murmurs, gallops or rubs  Gastrointestinal: Bowel Sounds present, soft, nontender.   Extremities: No peripheral edema. No clubbing or cyanosis.  Vascular: 2+ peripheral pulses  Neurological: A/O x 3, no focal deficits  Musculoskeletal: no calf tenderness.  Skin: No rashes.    MEDICATIONS:  MEDICATIONS  (STANDING):  aspirin 325 milliGRAM(s) Oral daily  atorvastatin 80 milliGRAM(s) Oral at bedtime  melatonin 5 milliGRAM(s) Oral at bedtime  mirtazapine 15 milliGRAM(s) Oral at bedtime  pantoprazole    Tablet 40 milliGRAM(s) Oral before breakfast  piperacillin/tazobactam IVPB.. 3.375 Gram(s) IV Intermittent every 8 hours  vancomycin  IVPB 1000 milliGRAM(s) IV Intermittent every 12 hours      LABS: All Labs Reviewed:                        14.5   11.20 )-----------( 184      ( 24 Jun 2019 10:53 )             43.8                         14.7   13.50 )-----------( 201      ( 23 Jun 2019 23:32 )             43.2     24 Jun 2019 10:53    141    |  107    |  14     ----------------------------<  72     4.2     |  27     |  1.08   23 Jun 2019 23:32    137    |  102    |  18     ----------------------------<  115    4.1     |  25     |  1.13     Ca    8.3        24 Jun 2019 10:53  Ca    8.4        23 Jun 2019 23:32    TPro  6.7    /  Alb  3.0    /  TBili  0.8    /  DBili  x      /  AST  20     /  ALT  25     /  AlkPhos  68     24 Jun 2019 10:53  TPro  6.7    /  Alb  3.2    /  TBili  0.7    /  DBili  x      /  AST  19     /  ALT  27     /  AlkPhos  70     23 Jun 2019 23:32    PT/INR - ( 23 Jun 2019 23:32 )   PT: 12.1 sec;   INR: 1.09 ratio         PTT - ( 23 Jun 2019 23:32 )  PTT:30.9 sec  CARDIAC MARKERS ( 24 Jun 2019 10:53 )  0.124 ng/mL / x     / x     / x     / x      CARDIAC MARKERS ( 24 Jun 2019 05:23 )  0.163 ng/mL / x     / x     / x     / x      CARDIAC MARKERS ( 23 Jun 2019 23:32 )  0.168 ng/mL / x     / x     / x     / x          Blood Culture:         RADIOLOGY/EKG:

## 2019-06-28 NOTE — PROGRESS NOTE ADULT - ASSESSMENT
79 year old man with history of CAD- stents x2 on aspirin, HLD- recent surgery for carpal tunnel on the right hand and Elbow surgery with Dr Leblanc admitted on 6/23 for evaluation of chest pain and mid epigastric pain which has been present for last 2 weeks; patient took nitroglycerin prior to coming to ED but symptoms persisted; upon admission the patient was febrile to 101.8 and had imaging showing pneumonia. He denies shortness of breath or coughing. No sick contacts or recent travel. No other specific complaints. His mid epigastric pain is worse after eating.   1. Patient admitted with pneumonia which will treat as health care associated pneumonia given recent surgery; also noted with leukocytosis most likely reactive to infection  - patient at risk for gram negative rods and other resistant bacteria   - iv hydration and supportive care   - serial cbc and monitor temperature   - reviewed prior medical records to evaluate for resistant or atypical pathogens   - day #2 zosyn and vancomycin   - tolerating antibiotics without rashes or side effects   - check vancomycin trough prior to fourth dose   2. other issues; per medicine
79M with upper abdominal pain x 2 weeks, on and off, associated with early satiety, relieved with NTG, and with gallstones on imaging but no sign of cholecystitis or choledocholithiasis on imaging or labs. EGD showed mild gastritis and Medellin's changes. MRCP negative.  No clear GI etiology of pain can be found.  Pending cardiac cath.    Recommend:  -continue supportive care  -cardiac cath pending  -continue PPI  -f/u EGD bx  -please call with any GI issues-pt to follow up with Dr. Otero after discharge
79 year old man with history of CAD- stents x2 on aspirin, HLD- recent surgery for carpal tunnel on the right hand and Elbow surgery with Dr Leblanc admitted on 6/23 for evaluation of chest pain and mid epigastric pain which has been present for last 2 weeks; patient took nitroglycerin prior to coming to ED but symptoms persisted; upon admission the patient was febrile to 101.8 and had imaging showing pneumonia. He denies shortness of breath or coughing. No sick contacts or recent travel. No other specific complaints. His mid epigastric pain is worse after eating.   1. Patient admitted with pneumonia which will treat as health care associated pneumonia given recent surgery; also noted with leukocytosis most likely reactive to infection  - patient at risk for gram negative rods and other resistant bacteria   - iv hydration and supportive care   - serial cbc and monitor temperature   - reviewed prior medical records to evaluate for resistant or atypical pathogens   - day #4 zosyn and vancomycin   - will stop antibiotics all together; will need repeat ct chest in few weeks to further evaluate lesion on imaging  - to have cardiac cath  2. other issues; per medicine
79 year old man with history of CAD- stents x2 on aspirin, HLD- recent surgery for carpal tunnel on the right hand and Elbow surgery with Dr Leblanc who presented to the ED with complaint of chest pain and abdominal pain. CP has been on and off for the past two weeks, he saw Dr Mazariegos- cardiac workup was done which was "ok" as per patient. He was prescribed Nitro PRN. Yesterday after lunch he started having epigastric abdominal pain and chills, denies fever. He had a recent abdominal sonogram at his PCP office and was told he has  gallstones. In the ED patient had a fever of 101.8 with leukocytosis. Chest CT showed RUL pnuemonia- patient was started on Vanco and zosyn. + elevated troponin- cardiology consulted.     Problem/Plan - 1:  ·  Problem: Epigastric pain.  Plan: r/o AC/ gallstone  Pancreatitis  CT abdomen showed tiny gallstones in the gallbladder  Negative murphys sign  check amylase and lipase  check HIDA  Keep NPO after midnight for HIDA  GI consult  recheck Liver panel  CT also showed increase prostate size- patient aware-  will need outpatient f/u with urologist  distended urinary bladder but no hydro on CT- check bladder scan  SOnogram showed - Cholelithiasis without sonographic signs of cholecystitis. Fatty liver and hepatomegaly.     Problem/Plan - 2:  ·  Problem: Pneumonia of right upper lobe due to infectious organism.  Plan: HCAP -concern for GNR and other resistant bacteria  on Zosyn and Vanco-  continue  ID consult pending  - monitor tmeps  monitor s/e of antibiotics  f/u blood culture.     Problem/Plan - 3:  ·  Problem: Chest pain, unspecified type.  Plan: trend troponin  tele monitoring.     Problem/Plan - 4:  ·  Problem: Coronary artery disease without angina pectoris, continue BB and ASA. Angiogram as above. He may need another coronary angiogram.
79M with upper abdominal pain x 2 weeks, on and off, associated with early satiety, relieved with NTG, and with gallstones on imaging but no sign of cholecystitis or choledocholithiasis on imaging or labs.  Rule out functional pain/spasm, gastritis, esophagitis, esophageal spasm, atypical presentation of biliary colic. Doubt retained CBD stone with normal labs. Atypical presentation of RUL PNA as well.    Recommend:  -continue supportive care, abx  -continue PPI  -will plan for EGD tomorrow (weds) afternoon if deemed acceptable risk by cardiology  -d/w pt in detail  made NPO p MN

## 2019-06-28 NOTE — DISCHARGE NOTE PROVIDER - CARE PROVIDER_API CALL
Harpal Mazariegos)  Cardiovascular Disease; Internal Medicine  [address] [place] [address]  Phone: [phone]  Fax: [phone]  Follow Up Time:

## 2019-06-28 NOTE — PROGRESS NOTE ADULT - REASON FOR ADMISSION
abdominal pain and chest pain

## 2019-06-28 NOTE — PROGRESS NOTE ADULT - PROVIDER SPECIALTY LIST ADULT
Cardiology
Gastroenterology
Hospitalist
Infectious Disease
Gastroenterology
Infectious Disease

## 2019-06-29 LAB
CULTURE RESULTS: SIGNIFICANT CHANGE UP
CULTURE RESULTS: SIGNIFICANT CHANGE UP
SPECIMEN SOURCE: SIGNIFICANT CHANGE UP
SPECIMEN SOURCE: SIGNIFICANT CHANGE UP

## 2019-07-03 DIAGNOSIS — R07.9 CHEST PAIN, UNSPECIFIED: ICD-10-CM

## 2019-07-03 DIAGNOSIS — E78.5 HYPERLIPIDEMIA, UNSPECIFIED: ICD-10-CM

## 2019-07-03 DIAGNOSIS — I25.10 ATHEROSCLEROTIC HEART DISEASE OF NATIVE CORONARY ARTERY WITHOUT ANGINA PECTORIS: ICD-10-CM

## 2019-07-03 DIAGNOSIS — Z95.5 PRESENCE OF CORONARY ANGIOPLASTY IMPLANT AND GRAFT: ICD-10-CM

## 2019-07-03 DIAGNOSIS — Z79.82 LONG TERM (CURRENT) USE OF ASPIRIN: ICD-10-CM

## 2019-07-03 DIAGNOSIS — K31.89 OTHER DISEASES OF STOMACH AND DUODENUM: ICD-10-CM

## 2019-07-03 DIAGNOSIS — D72.829 ELEVATED WHITE BLOOD CELL COUNT, UNSPECIFIED: ICD-10-CM

## 2019-07-03 DIAGNOSIS — K80.20 CALCULUS OF GALLBLADDER WITHOUT CHOLECYSTITIS WITHOUT OBSTRUCTION: ICD-10-CM

## 2019-07-03 DIAGNOSIS — Z88.8 ALLERGY STATUS TO OTHER DRUGS, MEDICAMENTS AND BIOLOGICAL SUBSTANCES: ICD-10-CM

## 2019-07-03 DIAGNOSIS — Z88.5 ALLERGY STATUS TO NARCOTIC AGENT: ICD-10-CM

## 2019-07-03 DIAGNOSIS — J15.6 PNEUMONIA DUE TO OTHER GRAM-NEGATIVE BACTERIA: ICD-10-CM

## 2019-07-03 DIAGNOSIS — K22.8 OTHER SPECIFIED DISEASES OF ESOPHAGUS: ICD-10-CM

## 2019-07-03 DIAGNOSIS — K31.7 POLYP OF STOMACH AND DUODENUM: ICD-10-CM

## 2019-07-03 DIAGNOSIS — Z79.899 OTHER LONG TERM (CURRENT) DRUG THERAPY: ICD-10-CM

## 2019-07-03 DIAGNOSIS — K76.0 FATTY (CHANGE OF) LIVER, NOT ELSEWHERE CLASSIFIED: ICD-10-CM

## 2019-09-10 ENCOUNTER — APPOINTMENT (OUTPATIENT)
Dept: SURGERY | Facility: CLINIC | Age: 79
End: 2019-09-10
Payer: MEDICARE

## 2019-09-10 VITALS — WEIGHT: 176 LBS | BODY MASS INDEX: 25.2 KG/M2 | HEIGHT: 70 IN

## 2019-09-10 DIAGNOSIS — Z78.9 OTHER SPECIFIED HEALTH STATUS: ICD-10-CM

## 2019-09-10 DIAGNOSIS — Z86.39 PERSONAL HISTORY OF OTHER ENDOCRINE, NUTRITIONAL AND METABOLIC DISEASE: ICD-10-CM

## 2019-09-10 PROCEDURE — 99203 OFFICE O/P NEW LOW 30 MIN: CPT

## 2019-09-10 RX ORDER — MIRTAZAPINE 30 MG/1
TABLET, FILM COATED ORAL
Refills: 0 | Status: ACTIVE | COMMUNITY

## 2019-09-10 RX ORDER — ROSUVASTATIN CALCIUM 20 MG/1
20 TABLET, FILM COATED ORAL
Refills: 0 | Status: ACTIVE | COMMUNITY

## 2019-09-10 RX ORDER — ASPIRIN 81 MG
81 TABLET, DELAYED RELEASE (ENTERIC COATED) ORAL
Refills: 0 | Status: ACTIVE | COMMUNITY

## 2019-09-11 NOTE — PHYSICAL EXAM
[de-identified] : no palpable thyroid nodules [Midline] : located in midline position [Normal] : orientation to person, place, and time: normal [FreeTextEntry2] : 8 mm right cheek biopsy site at edge of mandible.  2 mm black spot anterior to this.

## 2019-09-11 NOTE — REASON FOR VISIT
[Initial Consultation] : an initial consultation for [Spouse] : spouse [FreeTextEntry2] : melanoma Right cheek

## 2019-09-11 NOTE — HISTORY OF PRESENT ILLNESS
[de-identified] : Pt c/o biopsy proven melanoma right cheek.  denies pain or bleeding, itching, history of trauma or other skin cancers\par Path (Dermpath diag)  melanoma in situ

## 2019-09-11 NOTE — CONSULT LETTER
[Dear  ___] : Dear ~VANDANA, [Consult Letter:] : I had the pleasure of evaluating your patient, [unfilled]. [Consult Closing:] : Thank you very much for allowing me to participate in the care of this patient.  If you have any questions, please do not hesitate to contact me. [Please see my note below.] : Please see my note below. [Sincerely,] : Sincerely, [FreeTextEntry3] : Miguel Leon MD, FACS\par System Director, Endocrine Surgery\par [facility]\par  [FreeTextEntry2] : Dr. Efren Gu,  Dr. See Amaral [DrSakina  ___] : Dr. LAO

## 2019-09-11 NOTE — ASSESSMENT
[FreeTextEntry1] : discussed options for management. recommended excision of both lesions as ambulatory procedure. risks, benefits and alternatives discussed at length. potential for scarring, recurrence, no residual tumor, need for additional treatment discussed. [facility]. no indication for sentinel node biopsy due to in situ nature of tumor

## 2019-09-18 ENCOUNTER — RESULT REVIEW (OUTPATIENT)
Age: 79
End: 2019-09-18

## 2019-10-02 ENCOUNTER — OUTPATIENT (OUTPATIENT)
Dept: OUTPATIENT SERVICES | Facility: HOSPITAL | Age: 79
LOS: 1 days | End: 2019-10-02

## 2019-10-02 VITALS
OXYGEN SATURATION: 96 % | HEART RATE: 62 BPM | SYSTOLIC BLOOD PRESSURE: 108 MMHG | WEIGHT: 175.05 LBS | HEIGHT: 69 IN | DIASTOLIC BLOOD PRESSURE: 60 MMHG | TEMPERATURE: 98 F | RESPIRATION RATE: 16 BRPM

## 2019-10-02 DIAGNOSIS — D03.9 MELANOMA IN SITU, UNSPECIFIED: ICD-10-CM

## 2019-10-02 DIAGNOSIS — Z98.890 OTHER SPECIFIED POSTPROCEDURAL STATES: Chronic | ICD-10-CM

## 2019-10-02 DIAGNOSIS — D03.30 MELANOMA IN SITU OF UNSPECIFIED PART OF FACE: ICD-10-CM

## 2019-10-02 DIAGNOSIS — Z90.89 ACQUIRED ABSENCE OF OTHER ORGANS: Chronic | ICD-10-CM

## 2019-10-02 DIAGNOSIS — I25.10 ATHEROSCLEROTIC HEART DISEASE OF NATIVE CORONARY ARTERY WITHOUT ANGINA PECTORIS: ICD-10-CM

## 2019-10-02 LAB
ANION GAP SERPL CALC-SCNC: 15 MMO/L — HIGH (ref 7–14)
BUN SERPL-MCNC: 23 MG/DL — SIGNIFICANT CHANGE UP (ref 7–23)
CALCIUM SERPL-MCNC: 9.3 MG/DL — SIGNIFICANT CHANGE UP (ref 8.4–10.5)
CHLORIDE SERPL-SCNC: 106 MMOL/L — SIGNIFICANT CHANGE UP (ref 98–107)
CO2 SERPL-SCNC: 22 MMOL/L — SIGNIFICANT CHANGE UP (ref 22–31)
CREAT SERPL-MCNC: 1.07 MG/DL — SIGNIFICANT CHANGE UP (ref 0.5–1.3)
GLUCOSE SERPL-MCNC: 82 MG/DL — SIGNIFICANT CHANGE UP (ref 70–99)
HCT VFR BLD CALC: 43 % — SIGNIFICANT CHANGE UP (ref 39–50)
HGB BLD-MCNC: 14.2 G/DL — SIGNIFICANT CHANGE UP (ref 13–17)
MCHC RBC-ENTMCNC: 30.8 PG — SIGNIFICANT CHANGE UP (ref 27–34)
MCHC RBC-ENTMCNC: 33 % — SIGNIFICANT CHANGE UP (ref 32–36)
MCV RBC AUTO: 93.3 FL — SIGNIFICANT CHANGE UP (ref 80–100)
NRBC # FLD: 0 K/UL — SIGNIFICANT CHANGE UP (ref 0–0)
PLATELET # BLD AUTO: 249 K/UL — SIGNIFICANT CHANGE UP (ref 150–400)
PMV BLD: 11.2 FL — SIGNIFICANT CHANGE UP (ref 7–13)
POTASSIUM SERPL-MCNC: 4.3 MMOL/L — SIGNIFICANT CHANGE UP (ref 3.5–5.3)
POTASSIUM SERPL-SCNC: 4.3 MMOL/L — SIGNIFICANT CHANGE UP (ref 3.5–5.3)
RBC # BLD: 4.61 M/UL — SIGNIFICANT CHANGE UP (ref 4.2–5.8)
RBC # FLD: 14.3 % — SIGNIFICANT CHANGE UP (ref 10.3–14.5)
SODIUM SERPL-SCNC: 143 MMOL/L — SIGNIFICANT CHANGE UP (ref 135–145)
WBC # BLD: 5.63 K/UL — SIGNIFICANT CHANGE UP (ref 3.8–10.5)
WBC # FLD AUTO: 5.63 K/UL — SIGNIFICANT CHANGE UP (ref 3.8–10.5)

## 2019-10-02 RX ORDER — SODIUM CHLORIDE 9 MG/ML
1000 INJECTION, SOLUTION INTRAVENOUS
Refills: 0 | Status: DISCONTINUED | OUTPATIENT
Start: 2019-10-18 | End: 2019-11-04

## 2019-10-02 RX ORDER — SODIUM CHLORIDE 9 MG/ML
3 INJECTION INTRAMUSCULAR; INTRAVENOUS; SUBCUTANEOUS ONCE
Refills: 0 | Status: DISCONTINUED | OUTPATIENT
Start: 2019-10-18 | End: 2019-11-04

## 2019-10-02 NOTE — H&P PST ADULT - RS GEN PE MLT RESP DETAILS PC
airway patent/respirations non-labored/breath sounds equal/good air movement/no wheezes/clear to auscultation bilaterally/no chest wall tenderness

## 2019-10-02 NOTE — H&P PST ADULT - NSICDXPASTSURGICALHX_GEN_ALL_CORE_FT
PAST SURGICAL HISTORY:  History of lumbar laminectomy     History of repair of right rotator cuff     Status post tonsillectomy

## 2019-10-02 NOTE — H&P PST ADULT - HISTORY OF PRESENT ILLNESS
80 y/o male presents to PST fo preoperative evaluation with dx of melanoma in situ of unspecified part of face. Pt states wife noted skin lesion to his right cheek 1 month ago. Seen by Dermatologist and biopsy performed. Pathology confirmed melanoma. Scheduled for Excision Right Cheek melanoma on 10/18/2019.

## 2019-10-02 NOTE — H&P PST ADULT - NSICDXPASTMEDICALHX_GEN_ALL_CORE_FT
PAST MEDICAL HISTORY:  CAD (coronary artery disease)     Colon adenoma     Gastritis     H/O chest pain weeks ago- later to be determined to be gastritis    Hearing deficit     Heartburn     HLD (hyperlipidemia)     Hyperlipemia     Melanoma right cheek    Mild obstructive sleep apnea pt reports "boderline sleep apnea", no current use of CPAP    Overactive bladder     S/P coronary artery stent placement lad x2    Sleep trouble PAST MEDICAL HISTORY:  CAD (coronary artery disease)     Colon adenoma     Gastritis     H/O chest pain June 2019- later to be determined to be gastritis    Hearing deficit wears b/l hearing aids    Heartburn     HLD (hyperlipidemia)     Hyperlipemia     Melanoma right cheek    Mild obstructive sleep apnea pt reports "boderline sleep apnea", no current use of CPAP    Overactive bladder     S/P coronary artery stent placement lad x2    Sleep trouble

## 2019-10-02 NOTE — H&P PST ADULT - LAST CARDIAC ANGIOGRAM/IMAGING
s/p cardiac cath in June 2019. Denies coronary stent insertion. h/o PCI in 2008 and 2016. On ASA therapy

## 2019-10-02 NOTE — H&P PST ADULT - NSICDXPROBLEM_GEN_ALL_CORE_FT
PROBLEM DIAGNOSES  Problem: Melanoma in situ  Assessment and Plan: Scheduled for Excision Right Cheek melanoma on 10/18/2019.  Preop instructions given, pt verbalized understanding   Pt can take prescribed Protonix for GI prophylaxis on day of surgery     Problem: CAD (coronary artery disease)  Assessment and Plan: Medical clearance requested by surgeon- copy requested in PST.  ECHO report requested with clearance   Last angio report on chart   Pt advised to reduce current dose of Aspirin to 81mg for cardio protection PROBLEM DIAGNOSES  Problem: Melanoma in situ  Assessment and Plan: Scheduled for Excision Right Cheek melanoma on 10/18/2019.  Preop instructions given, pt verbalized understanding   Pt can take prescribed Protonix for GI prophylaxis on day of surgery     Problem: CAD (coronary artery disease)  Assessment and Plan: Medical clearance requested by surgeon- copy requested in PST.  ECHO report requested with clearance   Last angio report on chart   Pt advised to reduce current dose of Aspirin to 81mg for cardio protection    Pt with h/o ALEXA- no current use of CPAP  OR booking notified via fax

## 2019-10-02 NOTE — H&P PST ADULT - MEDICATION HERBAL REMEDIES, PROFILE
Impression: Age-related nuclear cataract, bilateral: H25.13. Plan: Discussed and reviewed diagnosis with patient, understood by patient. Cataract is not visually significant to patient, will continue to monitor.
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 Plan: Discussed and reviewed diagnosis with patient today, understood by patient, will continue to monitor. Discussed at some point patient may need surgical intevention. Patient to blow hot air in the AM to her eyes to help [facility] improve if needed. Advised to use kril oil and fish oil.
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 Plan: Discussed testing and reviewed diagnosis with patient today, understood by patient, intraocular pressure at/close to target without medication. Continue without medication and observe. Will continue to monitor.  

RTC 6 months IOP check
no

## 2019-10-17 PROBLEM — E78.5 HYPERLIPIDEMIA, UNSPECIFIED: Chronic | Status: ACTIVE | Noted: 2019-10-02

## 2019-10-17 PROBLEM — G47.9 SLEEP DISORDER, UNSPECIFIED: Chronic | Status: ACTIVE | Noted: 2019-10-02

## 2019-10-17 PROBLEM — K29.70 GASTRITIS, UNSPECIFIED, WITHOUT BLEEDING: Chronic | Status: ACTIVE | Noted: 2019-10-02

## 2019-10-17 PROBLEM — Z87.898 PERSONAL HISTORY OF OTHER SPECIFIED CONDITIONS: Chronic | Status: ACTIVE | Noted: 2019-10-02

## 2019-10-17 PROBLEM — N32.81 OVERACTIVE BLADDER: Chronic | Status: ACTIVE | Noted: 2019-10-02

## 2019-10-17 PROBLEM — C43.9 MALIGNANT MELANOMA OF SKIN, UNSPECIFIED: Chronic | Status: ACTIVE | Noted: 2019-10-02

## 2019-10-17 PROBLEM — H91.90 UNSPECIFIED HEARING LOSS, UNSPECIFIED EAR: Chronic | Status: ACTIVE | Noted: 2019-10-02

## 2019-10-17 PROBLEM — G47.33 OBSTRUCTIVE SLEEP APNEA (ADULT) (PEDIATRIC): Chronic | Status: ACTIVE | Noted: 2019-10-02

## 2019-10-18 ENCOUNTER — OUTPATIENT (OUTPATIENT)
Dept: OUTPATIENT SERVICES | Facility: HOSPITAL | Age: 79
LOS: 1 days | Discharge: HOME | End: 2019-10-18
Payer: MEDICARE

## 2019-10-18 ENCOUNTER — APPOINTMENT (OUTPATIENT)
Dept: SURGERY | Facility: HOSPITAL | Age: 79
End: 2019-10-18

## 2019-10-18 ENCOUNTER — OTHER (OUTPATIENT)
Age: 79
End: 2019-10-18

## 2019-10-18 ENCOUNTER — RESULT REVIEW (OUTPATIENT)
Age: 79
End: 2019-10-18

## 2019-10-18 VITALS
RESPIRATION RATE: 12 BRPM | HEART RATE: 78 BPM | TEMPERATURE: 97 F | OXYGEN SATURATION: 96 % | DIASTOLIC BLOOD PRESSURE: 64 MMHG | SYSTOLIC BLOOD PRESSURE: 110 MMHG

## 2019-10-18 VITALS
HEART RATE: 62 BPM | OXYGEN SATURATION: 96 % | HEIGHT: 69 IN | TEMPERATURE: 98 F | SYSTOLIC BLOOD PRESSURE: 143 MMHG | RESPIRATION RATE: 16 BRPM | WEIGHT: 175.05 LBS | DIASTOLIC BLOOD PRESSURE: 93 MMHG

## 2019-10-18 DIAGNOSIS — Z98.890 OTHER SPECIFIED POSTPROCEDURAL STATES: Chronic | ICD-10-CM

## 2019-10-18 DIAGNOSIS — Z90.89 ACQUIRED ABSENCE OF OTHER ORGANS: Chronic | ICD-10-CM

## 2019-10-18 DIAGNOSIS — D03.30 MELANOMA IN SITU OF UNSPECIFIED PART OF FACE: ICD-10-CM

## 2019-10-18 PROCEDURE — 88341 IMHCHEM/IMCYTCHM EA ADD ANTB: CPT | Mod: 26

## 2019-10-18 PROCEDURE — 21016 RESECT FACE/SCALP TUM 2 CM/>: CPT

## 2019-10-18 PROCEDURE — 88342 IMHCHEM/IMCYTCHM 1ST ANTB: CPT | Mod: 26

## 2019-10-18 PROCEDURE — 21016 RESECT FACE/SCALP TUM 2 CM/>: CPT | Mod: AS

## 2019-10-18 PROCEDURE — 88305 TISSUE EXAM BY PATHOLOGIST: CPT | Mod: 26

## 2019-10-18 NOTE — BRIEF OPERATIVE NOTE - NSICDXBRIEFPROCEDURE_GEN_ALL_CORE_FT
PROCEDURES:  Excision of benign skin lesion of face, 1.1 cm to 2.0 cm in diameter 18-Oct-2019 08:37:35  Kim Crook  Excision of malignant skin lesion of face, 2.1 cm to 3.0 cm in diameter 18-Oct-2019 08:37:18  Kim Crook

## 2019-10-18 NOTE — ASU DISCHARGE PLAN (ADULT/PEDIATRIC) - CARE PROVIDER_API CALL
Miguel Leon)  Plastic Surgery; Surgery  [address] [facility], [facility] [address]  [place] [address]  Phone: [phone]  Fax: [phone]  Follow Up Time:

## 2019-10-18 NOTE — BRIEF OPERATIVE NOTE - NSICDXBRIEFPREOP_GEN_ALL_CORE_FT
PRE-OP DIAGNOSIS:  Skin lesion of face 18-Oct-2019 08:37:57  Kim Crook  Skin cancer of face 18-Oct-2019 08:37:47  Kim Crook

## 2019-10-18 NOTE — BRIEF OPERATIVE NOTE - NSICDXBRIEFPOSTOP_GEN_ALL_CORE_FT
POST-OP DIAGNOSIS:  Skin lesion of face 18-Oct-2019 08:38:17  Kim Crook  Skin cancer of face 18-Oct-2019 08:38:09  Kim Crook

## 2019-10-18 NOTE — ASU DISCHARGE PLAN (ADULT/PEDIATRIC) - CALL YOUR DOCTOR IF YOU HAVE ANY OF THE FOLLOWING:
Swelling that gets worse Fever greater than (need to indicate Fahrenheit or Celsius)/Swelling that gets worse/Bleeding that does not stop

## 2019-10-22 ENCOUNTER — APPOINTMENT (OUTPATIENT)
Dept: SURGERY | Facility: CLINIC | Age: 79
End: 2019-10-22

## 2019-10-31 ENCOUNTER — APPOINTMENT (OUTPATIENT)
Dept: SURGERY | Facility: CLINIC | Age: 79
End: 2019-10-31
Payer: MEDICARE

## 2019-10-31 PROCEDURE — 99024 POSTOP FOLLOW-UP VISIT: CPT

## 2019-10-31 NOTE — ASSESSMENT
[FreeTextEntry1] : s/p excision melanoma right cheek\par call next week for path \par daily care\par f/u 4 months\par f/u derm

## 2019-10-31 NOTE — PHYSICAL EXAM
[de-identified] : well healed incision [Midline] : located in midline position [Normal] : orientation to person, place, and time: normal

## 2019-11-07 ENCOUNTER — OTHER (OUTPATIENT)
Age: 79
End: 2019-11-07

## 2019-11-07 LAB — SURGICAL PATHOLOGY STUDY: SIGNIFICANT CHANGE UP

## 2020-01-09 NOTE — PROVIDER CONTACT NOTE (OTHER) - REASON
----- Message from Melvin Hinton sent at 2020 10:07 AM CST -----  Contact: Wife - Julienne Fitzgerald  MRN: [id number]  : 1958  PCP: Moise Aragon  Home Phone      [phone]  Work Phone      Not on file.  [employer]          [phone]      MESSAGE: has appt on 1/15/20 @ 9:15 -- requesting later in the day -- after 2:00    Call Julienne @ [phone]    PCP: Mahesh   Consult

## 2020-02-06 ENCOUNTER — RESULT REVIEW (OUTPATIENT)
Age: 80
End: 2020-02-06

## 2020-02-06 ENCOUNTER — OUTPATIENT (OUTPATIENT)
Dept: OUTPATIENT SERVICES | Facility: HOSPITAL | Age: 80
LOS: 1 days | Discharge: HOME | End: 2020-02-06
Payer: MEDICARE

## 2020-02-06 VITALS
HEIGHT: 69 IN | DIASTOLIC BLOOD PRESSURE: 96 MMHG | RESPIRATION RATE: 14 BRPM | WEIGHT: 181 LBS | HEART RATE: 45 BPM | SYSTOLIC BLOOD PRESSURE: 123 MMHG | OXYGEN SATURATION: 98 % | TEMPERATURE: 98 F

## 2020-02-06 DIAGNOSIS — Z90.89 ACQUIRED ABSENCE OF OTHER ORGANS: Chronic | ICD-10-CM

## 2020-02-06 DIAGNOSIS — Z98.890 OTHER SPECIFIED POSTPROCEDURAL STATES: Chronic | ICD-10-CM

## 2020-02-06 DIAGNOSIS — K22.70 BARRETT'S ESOPHAGUS WITHOUT DYSPLASIA: ICD-10-CM

## 2020-02-06 PROCEDURE — 88312 SPECIAL STAINS GROUP 1: CPT

## 2020-02-06 PROCEDURE — 88313 SPECIAL STAINS GROUP 2: CPT

## 2020-02-06 PROCEDURE — 88313 SPECIAL STAINS GROUP 2: CPT | Mod: 26

## 2020-02-06 PROCEDURE — 88305 TISSUE EXAM BY PATHOLOGIST: CPT | Mod: 26

## 2020-02-06 PROCEDURE — 88305 TISSUE EXAM BY PATHOLOGIST: CPT

## 2020-02-06 PROCEDURE — 88312 SPECIAL STAINS GROUP 1: CPT | Mod: 26

## 2020-02-06 RX ORDER — OMEPRAZOLE 10 MG/1
1 CAPSULE, DELAYED RELEASE ORAL
Qty: 0 | Refills: 0 | DISCHARGE

## 2020-02-06 NOTE — ASU PATIENT PROFILE, ADULT - PMH
CAD (coronary artery disease)    Colon adenoma    Gastritis    H/O chest pain  June 2019- later to be determined to be gastritis  Hearing deficit  wears b/l hearing aids  Heartburn    HLD (hyperlipidemia)    Hyperlipemia    Melanoma  right cheek  Mild obstructive sleep apnea  pt reports "boderline sleep apnea", no current use of CPAP  Overactive bladder    S/P coronary artery stent placement  lad x2  Sleep trouble

## 2020-02-06 NOTE — ASU PATIENT PROFILE, ADULT - PSH
History of lumbar laminectomy    History of repair of right rotator cuff    Status post tonsillectomy

## 2020-02-10 DIAGNOSIS — K21.0 GASTRO-ESOPHAGEAL REFLUX DISEASE WITH ESOPHAGITIS: ICD-10-CM

## 2020-02-10 DIAGNOSIS — E78.5 HYPERLIPIDEMIA, UNSPECIFIED: ICD-10-CM

## 2020-02-10 DIAGNOSIS — K44.9 DIAPHRAGMATIC HERNIA WITHOUT OBSTRUCTION OR GANGRENE: ICD-10-CM

## 2020-02-10 DIAGNOSIS — K22.719 BARRETT'S ESOPHAGUS WITH DYSPLASIA, UNSPECIFIED: ICD-10-CM

## 2020-02-10 DIAGNOSIS — K31.7 POLYP OF STOMACH AND DUODENUM: ICD-10-CM

## 2020-02-10 DIAGNOSIS — R12 HEARTBURN: ICD-10-CM

## 2020-02-10 DIAGNOSIS — I25.10 ATHEROSCLEROTIC HEART DISEASE OF NATIVE CORONARY ARTERY WITHOUT ANGINA PECTORIS: ICD-10-CM

## 2020-02-20 NOTE — ASU PATIENT PROFILE, ADULT - BILL OF RIGHTS/ADMISSION INFORMATION PROVIDED TO:
----- Message from Katty Alberts MD sent at 2/20/2020  9:38 AM CST -----  Has been exposed  To mono in the past, but does not have current infection. How is she feeling? Patient

## 2020-03-12 ENCOUNTER — APPOINTMENT (OUTPATIENT)
Dept: SURGERY | Facility: CLINIC | Age: 80
End: 2020-03-12
Payer: MEDICARE

## 2020-03-12 PROCEDURE — 99213 OFFICE O/P EST LOW 20 MIN: CPT

## 2020-03-12 NOTE — HISTORY OF PRESENT ILLNESS
[de-identified] : 5 months s/p resection right cheek MIS. denies pain, bleeding, drainage, infection or new lesions.  no changes medically since last visit

## 2020-03-12 NOTE — PHYSICAL EXAM
[de-identified] : no palpable thyroid nodules [Midline] : located in midline position [Normal] : orientation to person, place, and time: normal [FreeTextEntry2] : right cheek and chin sites well no healed. no evidence of recurrent disease. no new lesions noted

## 2020-03-12 NOTE — ASSESSMENT
[FreeTextEntry1] : will observe. to continue to see dr palmer on regular basis.  to return earlier if any change

## 2020-05-04 ENCOUNTER — EMERGENCY (EMERGENCY)
Facility: HOSPITAL | Age: 80
LOS: 0 days | Discharge: HOME | End: 2020-05-04
Payer: MEDICARE

## 2020-05-04 DIAGNOSIS — Z98.890 OTHER SPECIFIED POSTPROCEDURAL STATES: Chronic | ICD-10-CM

## 2020-05-04 DIAGNOSIS — Z85.820 PERSONAL HISTORY OF MALIGNANT MELANOMA OF SKIN: ICD-10-CM

## 2020-05-04 DIAGNOSIS — I25.10 ATHEROSCLEROTIC HEART DISEASE OF NATIVE CORONARY ARTERY WITHOUT ANGINA PECTORIS: ICD-10-CM

## 2020-05-04 DIAGNOSIS — R50.9 FEVER, UNSPECIFIED: ICD-10-CM

## 2020-05-04 DIAGNOSIS — Z88.1 ALLERGY STATUS TO OTHER ANTIBIOTIC AGENTS STATUS: ICD-10-CM

## 2020-05-04 DIAGNOSIS — B34.9 VIRAL INFECTION, UNSPECIFIED: ICD-10-CM

## 2020-05-04 DIAGNOSIS — Z95.5 PRESENCE OF CORONARY ANGIOPLASTY IMPLANT AND GRAFT: ICD-10-CM

## 2020-05-04 DIAGNOSIS — E78.5 HYPERLIPIDEMIA, UNSPECIFIED: ICD-10-CM

## 2020-05-04 DIAGNOSIS — Z88.5 ALLERGY STATUS TO NARCOTIC AGENT: ICD-10-CM

## 2020-05-04 DIAGNOSIS — Z90.89 ACQUIRED ABSENCE OF OTHER ORGANS: Chronic | ICD-10-CM

## 2020-05-04 DIAGNOSIS — Z20.828 CONTACT WITH AND (SUSPECTED) EXPOSURE TO OTHER VIRAL COMMUNICABLE DISEASES: ICD-10-CM

## 2020-05-04 DIAGNOSIS — G47.33 OBSTRUCTIVE SLEEP APNEA (ADULT) (PEDIATRIC): ICD-10-CM

## 2020-05-04 DIAGNOSIS — N32.81 OVERACTIVE BLADDER: ICD-10-CM

## 2020-05-04 DIAGNOSIS — Z79.82 LONG TERM (CURRENT) USE OF ASPIRIN: ICD-10-CM

## 2020-05-04 LAB — SARS-COV-2 RNA SPEC QL NAA+PROBE: SIGNIFICANT CHANGE UP

## 2020-05-04 PROCEDURE — 99283 EMERGENCY DEPT VISIT LOW MDM: CPT | Mod: CS

## 2020-05-04 PROCEDURE — 99282 EMERGENCY DEPT VISIT SF MDM: CPT

## 2020-05-04 PROCEDURE — 87635 SARS-COV-2 COVID-19 AMP PRB: CPT

## 2020-05-04 PROCEDURE — 99283 EMERGENCY DEPT VISIT LOW MDM: CPT

## 2020-05-04 NOTE — ED STATDOCS - OBJECTIVE STATEMENT
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 pt denies any complaints.   Pt concerned for possible COVID-19.   Pt here for testing.

## 2020-05-04 NOTE — ED ADULT NURSE NOTE - CHIEF COMPLAINT QUOTE
pt presents to ED due to complaints of fever cough and possible exposure for COVID19 testing. pt. number [phone]

## 2020-05-04 NOTE — ED ADULT TRIAGE NOTE - CHIEF COMPLAINT QUOTE
pt presents to ED due to complaints of fever cough and possible exposure for COVID19 testing. pt. number [phone]

## 2020-05-04 NOTE — ED STATDOCS - NS ED ROS FT
ROS:   Constitutional- no fever, no chills.    ENT- no rhinorrhea, no sore throat, no congestion.    Cardiac- no chest pain, no palpitations,   Respiratory- no cough, no SOB    Abdomen- No nausea, no vomiting, no diarrhea.    Urinary- no dysuria, no urgency, no frequency.    Skin- No rashes

## 2020-05-04 NOTE — ED STATDOCS - PATIENT PORTAL LINK FT
You can access the FollowMyHealth Patient Portal offered by [facility] by registering at the following website: [email or web address]. By joining [employer]’s FollowMyHealth portal, you will also be able to view your health information using other applications (apps) compatible with our system.

## 2020-05-04 NOTE — ED ADULT NURSE NOTE - OBJECTIVE STATEMENT
Patient evaluated, treated, and discharged by PA. Refer to PA note for assessment. Discharge instructions given verbally and understood by patient. Self-quarantine and COVID-19 information provided to patient. Unable to sign secondary to COVID-19 PUI. Pt. gives verbal consent for results to be given via phone call.

## 2020-05-04 NOTE — ED STATDOCS - PHYSICAL EXAMINATION
Constitutional: NAD AAOx3. Nontoxic, well appearing. Speaking full sentences  w/o distress  Eyes: EOMI, pupils equal  Head: Normocephalic atraumatic  Mouth: no airway obstruction  Cardiac: [id number]   Resp: Lungs CTAB  GI: Abd s/nt/nd  Neuro: motor and sensory intact

## 2020-09-17 ENCOUNTER — APPOINTMENT (OUTPATIENT)
Dept: SURGERY | Facility: CLINIC | Age: 80
End: 2020-09-17
Payer: MEDICARE

## 2020-09-17 PROCEDURE — 99213 OFFICE O/P EST LOW 20 MIN: CPT

## 2020-09-17 NOTE — PHYSICAL EXAM
[de-identified] : no palpable thyroid nodules [Midline] : located in midline position [Normal] : orientation to person, place, and time: normal [FreeTextEntry2] : right cheek and chin sites well  healed. no evidence of recurrent disease. no new lesions noted

## 2020-09-17 NOTE — HISTORY OF PRESENT ILLNESS
[de-identified] : 1 year  s/p resection right cheek MIS. denies pain, bleeding, drainage, infection or new lesions.  no changes medically since last visit

## 2020-10-07 ENCOUNTER — EMERGENCY (EMERGENCY)
Facility: HOSPITAL | Age: 80
LOS: 0 days | Discharge: HOME | End: 2020-10-07
Payer: MEDICARE

## 2020-10-07 VITALS
DIASTOLIC BLOOD PRESSURE: 70 MMHG | HEART RATE: 52 BPM | OXYGEN SATURATION: 93 % | RESPIRATION RATE: 16 BRPM | TEMPERATURE: 98 F | HEIGHT: 69 IN | SYSTOLIC BLOOD PRESSURE: 102 MMHG

## 2020-10-07 DIAGNOSIS — Z20.828 CONTACT WITH AND (SUSPECTED) EXPOSURE TO OTHER VIRAL COMMUNICABLE DISEASES: ICD-10-CM

## 2020-10-07 DIAGNOSIS — Z98.890 OTHER SPECIFIED POSTPROCEDURAL STATES: Chronic | ICD-10-CM

## 2020-10-07 DIAGNOSIS — Z90.89 ACQUIRED ABSENCE OF OTHER ORGANS: Chronic | ICD-10-CM

## 2020-10-07 LAB — SARS-COV-2 RNA SPEC QL NAA+PROBE: SIGNIFICANT CHANGE UP

## 2020-10-07 PROCEDURE — U0003: CPT

## 2020-10-07 PROCEDURE — 99282 EMERGENCY DEPT VISIT SF MDM: CPT

## 2020-10-07 PROCEDURE — 99283 EMERGENCY DEPT VISIT LOW MDM: CPT

## 2020-10-07 PROCEDURE — 99283 EMERGENCY DEPT VISIT LOW MDM: CPT | Mod: CS

## 2020-10-07 NOTE — ED ADULT NURSE NOTE - OBJECTIVE STATEMENT
Pt requesting covid testing, no symptoms, no exposure.  Patient evaluated, treated and discharged by PA. Refer to PA note for assessment. Discharge instructions provided.  Patient provides verbal consent to receive results via text or email.

## 2020-10-07 NOTE — ED STATDOCS - CLINICAL SUMMARY MEDICAL DECISION MAKING FREE TEXT BOX
swab/dc. Pt made aware of low O2 sat, reports it is always in the low 90s, has had workup by pulmonology that was unremarkable. pt feeling well, not SOB at this time

## 2020-10-07 NOTE — ED STATDOCS - PATIENT PORTAL LINK FT
You can access the FollowMyHealth Patient Portal offered by [facility] by registering at the following website: [email or web address]. By joining [facility]’s FollowMyHealth portal, you will also be able to view your health information using other applications (apps) compatible with our system.

## 2020-10-23 NOTE — ASU PREOP CHECKLIST - BMI (KG/M2)
Health Maintenance Due   Topic Date Due   • Influenza Vaccine (1) 09/01/2020   • Annual Physical (ages 3-18)  11/26/2020       Patient is due for topics as listed above but is not proceeding with Immunization(s) Influenza at this time.         
Impression:   · Strep Throat    Plan:   · The signs and symptoms of worrisome developments,alternative and additional diagnoses, were discussed.  They will return to clinic or call as needed.  Symptomatic treatment was recommended. covid-19    Follow-up:   No follow-ups on file.    Associated diagnoses for this encounter:  1. Exposure to COVID-19 virus        Orders Placed This Encounter   • SARS-CoV-2 RNA, Qualitative, Real Time PCR     .   _____________________________________________________________    SUBJECTIVE :  The patient is 7 year old and has had a sore throat for the last 2 days.    There has been fever to 100.7.  These symptoms did not respond to over-the-counter treatment.    There has been no emesis.  The patient has not been taking fluids well.  There has been no body rash noted.  The patient has had minimal nasal congestion.     The patient complains of no cough.    There has been no ear pain.    The child has no prior history of strep throat and has no known exposure to strep.  The patient has intake/output:  Slightly decreased po intake; Normal urinary output   The patient has been restless.    Ill contacts at home: No one else is sick at home    OBJECTIVE:  Vitals:  Visit Vitals  Pulse 92   Temp 98.1 °F (36.7 °C) (Temporal)   Wt 19.1 kg   ·   · General:   The patient is Alert and in no acute distress.  · Eyes:   Conjunctivae clear bilaterally  · Ears:   TMs Normal bilaterally  · Ear canals:  Normal bilaterally  · Oropharynx:  Inflamed and Palatal Petechiae present  · Neck:   Supple  · Lungs:   Clear to auscultation   Respiratory effort normal  · Heart:   Regular rate and rhythm without murmur        Neurologic: good tone  · Skin:   pink, warm, no rashes, no ecchymosis      
26.7

## 2021-01-21 NOTE — ASU PATIENT PROFILE, ADULT - ALCOHOL USE HISTORY SINGLE SELECT
Ordered Vit D2 in error, patient needs labs checked, should be a 3 month or 12 week supply on 12-17-21, will let patient know that she needs to take full 12 weeks of Vit D2, then schedule the lab.        KIMBERLY Ronquillo     yes...

## 2021-02-26 NOTE — ASU PATIENT PROFILE, ADULT - PT NEEDS ASSIST
Patient here for vomiting since last night. no Patient here for vomiting since last night. Patient endorses epigastric pain. 6 episodes of emesis reported. Patients father states that patients "heart was racing." Patient is awake & alert. No PMHx. No PSHx. Immunizations up to date. NKDA. Apical heart rate auscultated and correlated with electronic vitals machine.

## 2021-03-16 ENCOUNTER — APPOINTMENT (OUTPATIENT)
Dept: SURGERY | Facility: CLINIC | Age: 81
End: 2021-03-16
Payer: MEDICARE

## 2021-03-16 DIAGNOSIS — M92.219 OSTEOCHONDROSIS (JUVENILE) OF CARPAL LUNATE [KIENBOCK], UNSPECIFIED HAND: ICD-10-CM

## 2021-03-16 PROCEDURE — 99214 OFFICE O/P EST MOD 30 MIN: CPT

## 2021-03-16 NOTE — HISTORY OF PRESENT ILLNESS
[de-identified] : 1 1/2 years  s/p resection right cheek MIS. denies pain, bleeding, drainage, infection or new lesions.  no changes medically since last visit  with exception of recent left wrist fracture - being treated with splint.  apparently seen by dermatologist who did not notice any lesions.  I have reviewed all old and new data and available images.\par

## 2021-03-16 NOTE — ASSESSMENT
[FreeTextEntry1] : will observe. to continue to see dr palmer on regular basis.  to return earlier if any change.  patient has been given the opportunity to ask questions, and all of the patient's questions have been answered to their satisfaction\par

## 2021-03-16 NOTE — PHYSICAL EXAM
[de-identified] : no palpable thyroid nodules [Midline] : located in midline position [Normal] : orientation to person, place, and time: normal [FreeTextEntry2] : right cheek and chin sites well  healed. no evidence of recurrent disease. no new lesions noted

## 2021-03-30 ENCOUNTER — OUTPATIENT (OUTPATIENT)
Dept: OUTPATIENT SERVICES | Facility: HOSPITAL | Age: 81
LOS: 1 days | End: 2021-03-30
Payer: MEDICARE

## 2021-03-30 DIAGNOSIS — Z98.890 OTHER SPECIFIED POSTPROCEDURAL STATES: Chronic | ICD-10-CM

## 2021-03-30 DIAGNOSIS — Z20.828 CONTACT WITH AND (SUSPECTED) EXPOSURE TO OTHER VIRAL COMMUNICABLE DISEASES: ICD-10-CM

## 2021-03-30 DIAGNOSIS — Z90.89 ACQUIRED ABSENCE OF OTHER ORGANS: Chronic | ICD-10-CM

## 2021-03-30 LAB — SARS-COV-2 RNA SPEC QL NAA+PROBE: SIGNIFICANT CHANGE UP

## 2021-03-30 PROCEDURE — U0003: CPT

## 2021-03-30 PROCEDURE — C9803: CPT

## 2021-03-30 PROCEDURE — U0005: CPT

## 2021-03-31 DIAGNOSIS — Z20.828 CONTACT WITH AND (SUSPECTED) EXPOSURE TO OTHER VIRAL COMMUNICABLE DISEASES: ICD-10-CM

## 2021-04-01 ENCOUNTER — RESULT REVIEW (OUTPATIENT)
Age: 81
End: 2021-04-01

## 2021-04-01 ENCOUNTER — OUTPATIENT (OUTPATIENT)
Dept: OUTPATIENT SERVICES | Facility: HOSPITAL | Age: 81
LOS: 1 days | Discharge: HOME | End: 2021-04-01
Payer: MEDICARE

## 2021-04-01 VITALS
WEIGHT: 171.96 LBS | OXYGEN SATURATION: 100 % | TEMPERATURE: 97 F | DIASTOLIC BLOOD PRESSURE: 80 MMHG | SYSTOLIC BLOOD PRESSURE: 133 MMHG | HEART RATE: 47 BPM | RESPIRATION RATE: 17 BRPM | HEIGHT: 70 IN

## 2021-04-01 DIAGNOSIS — K44.9 DIAPHRAGMATIC HERNIA WITHOUT OBSTRUCTION OR GANGRENE: ICD-10-CM

## 2021-04-01 DIAGNOSIS — Z88.5 ALLERGY STATUS TO NARCOTIC AGENT: ICD-10-CM

## 2021-04-01 DIAGNOSIS — K64.8 OTHER HEMORRHOIDS: ICD-10-CM

## 2021-04-01 DIAGNOSIS — Z12.11 ENCOUNTER FOR SCREENING FOR MALIGNANT NEOPLASM OF COLON: ICD-10-CM

## 2021-04-01 DIAGNOSIS — Z86.010 PERSONAL HISTORY OF COLONIC POLYPS: ICD-10-CM

## 2021-04-01 DIAGNOSIS — Z88.1 ALLERGY STATUS TO OTHER ANTIBIOTIC AGENTS STATUS: ICD-10-CM

## 2021-04-01 DIAGNOSIS — K51.40 INFLAMMATORY POLYPS OF COLON WITHOUT COMPLICATIONS: ICD-10-CM

## 2021-04-01 DIAGNOSIS — Z90.89 ACQUIRED ABSENCE OF OTHER ORGANS: Chronic | ICD-10-CM

## 2021-04-01 DIAGNOSIS — Z98.890 OTHER SPECIFIED POSTPROCEDURAL STATES: Chronic | ICD-10-CM

## 2021-04-01 DIAGNOSIS — K22.70 BARRETT'S ESOPHAGUS WITHOUT DYSPLASIA: ICD-10-CM

## 2021-04-01 DIAGNOSIS — E78.5 HYPERLIPIDEMIA, UNSPECIFIED: ICD-10-CM

## 2021-04-01 DIAGNOSIS — I25.10 ATHEROSCLEROTIC HEART DISEASE OF NATIVE CORONARY ARTERY WITHOUT ANGINA PECTORIS: ICD-10-CM

## 2021-04-01 DIAGNOSIS — K51.90 ULCERATIVE COLITIS, UNSPECIFIED, WITHOUT COMPLICATIONS: ICD-10-CM

## 2021-04-01 PROCEDURE — 88313 SPECIAL STAINS GROUP 2: CPT | Mod: 26

## 2021-04-01 PROCEDURE — 88313 SPECIAL STAINS GROUP 2: CPT

## 2021-04-01 PROCEDURE — 88305 TISSUE EXAM BY PATHOLOGIST: CPT

## 2021-04-01 PROCEDURE — 88305 TISSUE EXAM BY PATHOLOGIST: CPT | Mod: 26

## 2021-04-01 RX ORDER — ASPIRIN/CALCIUM CARB/MAGNESIUM 324 MG
1 TABLET ORAL
Qty: 0 | Refills: 0 | DISCHARGE

## 2021-04-01 RX ORDER — MIRABEGRON 50 MG/1
1 TABLET, EXTENDED RELEASE ORAL
Qty: 0 | Refills: 0 | DISCHARGE

## 2021-04-01 RX ORDER — ROSUVASTATIN CALCIUM 5 MG/1
1 TABLET ORAL
Qty: 0 | Refills: 0 | DISCHARGE

## 2021-04-01 RX ORDER — ZINC ACETATE DIHYDRATE 100 %
1 CRYSTALS MISCELLANEOUS
Qty: 0 | Refills: 0 | DISCHARGE

## 2021-04-01 RX ORDER — NITROGLYCERIN 6.5 MG
1 CAPSULE, EXTENDED RELEASE ORAL
Qty: 0 | Refills: 0 | DISCHARGE

## 2021-04-01 RX ORDER — TAMSULOSIN HYDROCHLORIDE 0.4 MG/1
1 CAPSULE ORAL
Qty: 0 | Refills: 0 | DISCHARGE

## 2021-04-01 RX ORDER — UBIDECARENONE 100 MG
1 CAPSULE ORAL
Qty: 0 | Refills: 0 | DISCHARGE

## 2021-04-01 RX ORDER — CHOLECALCIFEROL (VITAMIN D3) 125 MCG
1 CAPSULE ORAL
Qty: 0 | Refills: 0 | DISCHARGE

## 2021-04-01 RX ORDER — FAMOTIDINE 10 MG/ML
1 INJECTION INTRAVENOUS
Qty: 0 | Refills: 0 | DISCHARGE

## 2021-04-01 RX ORDER — PANTOPRAZOLE SODIUM 20 MG/1
1 TABLET, DELAYED RELEASE ORAL
Qty: 0 | Refills: 0 | DISCHARGE

## 2021-04-01 RX ORDER — ZOLPIDEM TARTRATE 10 MG/1
1 TABLET ORAL
Qty: 0 | Refills: 0 | DISCHARGE

## 2021-04-01 RX ORDER — MIRTAZAPINE 45 MG/1
0.5 TABLET, ORALLY DISINTEGRATING ORAL
Qty: 0 | Refills: 0 | DISCHARGE

## 2021-04-01 RX ORDER — MAGNESIUM OXIDE 400 MG ORAL TABLET 241.3 MG
0.5 TABLET ORAL
Qty: 0 | Refills: 0 | DISCHARGE

## 2021-04-01 NOTE — ASU PREOP CHECKLIST - IDENTIFICATION BAND VERIFIED
Progress Notes by Leila Smith DO at 07/07/17 09:05 AM     Author:  Leila Smith DO Service:  (none) Author Type:  Physician     Filed:  07/07/17 09:20 AM Encounter Date:  7/7/2017 Status:  Signed     :  Leila Smith DO (Physician)              SUBJECTIVE:  HPI: Jhon Elam is a 78 year old male with[LR1.1T] prostate cancer status post RA LP on 2/25/2013 and urge incontinence.  Patient is taking Detrol 4 mg daily.  His PSA is undetectable.[LR1.1M]        OBJECTIVE:   Physical Exam:       Temp 97.9 °F (36.6 °C) (Tympanic)  Ht 5' 8\" (1.727 m)  Wt 165 lb (74.8 kg)  BMI 25.09 kg/m2   Constitutional: Well developed, well nourished male   Psych: Alert and oriented to person, place, and time. Cooperative.   Skin: Normal color/tone. Without obvious lesions  Lungs: No laboring noted  Abdomen: Soft, non-tender, not distended. No organomegaly.  Lymph: No inguinal nodes  Extremities: No cyanosis, clubbing, or edema       Laboratory:  Lab Results      Component  Value Date    PSA <0.06 07/03/2017    PSA <0.06 12/20/2016    PSA <0.06 06/22/2016    PSA < 0.06 11/28/2015    PSA < 0.06 06/11/2015    PSA < 0.06 12/04/2014    PSA < 0.06 06/12/2014    PSA < 0.06 12/05/2013    PSA < 0.06 06/08/2013    PSA 3.22 12/11/2012    PSA 1.86 12/29/2006    PSA 2.00 11/14/2003    PSA 2.30 09/04/2002    PSA 1.92 01/04/2001    PSA 1.64  01/15/1999     Most recent UA:  Lab Results      Component  Value Date    APPEARANCE HAZY 04/24/2013    COLOR YELLOW 04/24/2013    LONG 7.5 04/24/2013    SPGRAVU 1.014 04/24/2013    GLUCOSEU NEGATIVE 04/24/2013    BLOOD SMALL 04/24/2013    KETONES NEGATIVE 04/24/2013    PROTEINU TRACE 04/24/2013    UROBILIN <2.0 MG 04/24/2013    LEUKEST TRACE 04/24/2013    NITRATE NEGATIVE 04/24/2013    IRISCELLCT 39 01/04/2001    WBCU 6 04/24/2013    RBCU 35 04/24/2013       ASSESSMENT:[LR1.1T]    Prostate cancer  Urge incontinence[LR1.1M]     PLAN:[LR1.1T]   Continue Detrol 4 mg daily[LR1.1M]  Follow up in 6  months  PSA 2-3 days before the next visit[LR1.2M]     Patient verbalized understanding.     Electronically Signed by:    Leila Smith DO , 7/7/2017[LR1.1T]         Revision History        User Key Date/Time User Provider Type Action    > LR1.2 07/07/17 09:20 AM Leila Smith DO Physician Sign     LR1.1 07/07/17 09:05 AM Leila Smith DO Physician     M - Manual, T - Template             done

## 2021-04-01 NOTE — ASU PREOP CHECKLIST - LOOSE TEETH
J Luis Carpio came into the office asking to schedule an appt and asked if he can get his Protonix refilled as he is currently having a lot of acid reflux issues. Pt scheduled for 7/21/2020.
no

## 2021-10-19 NOTE — ASU PATIENT PROFILE, ADULT - VISION (WITH CORRECTIVE LENSES IF THE PATIENT USUALLY WEARS THEM):
Brief Operative Note    Patient: Dani Crandall 64 year old male    MRN: [id number]    Surgeon(s): Ender Parker MD  Phone Number: [phone]                       Surgeon(s) and Role:     * Ender Parker MD - Primary    Assistant(s):   Josette Guerrero MD    Pre-Op Diagnosis: LEFT SHOULDER OA     Post-Op Diagnosis: same as preop     Procedure: Procedure(s):  LEFT SHOULDER HUMERAL HEAD RESURFACING    Anesthesia Type: General                                   Complications: None    Description: see above    Findings: left shoulder degenerative changes    Specimens Removed: No specimens collected     Estimated Blood Loss: 50cc    Assistant Tasks: Opening and closing     Implants:   Implant Name Type Inv. Item Serial No.  Lot No. LRB No. Used Action   45 RIMMED SPEED PIN      [id number] Left 1 Implanted   HEAD HUML  PRES FIT SIZE D - [id number] Head / Ball HEAD HUML  PRES FIT SIZE D  CT[employer]  [id number] Left 1 Implanted         I was present for the key portions of the procedure and was immediately available for the non-key portions      
Partially impaired: cannot see medication labels or newsprint, but can see obstacles in path, and the surrounding layout; can count fingers at arm's length

## 2022-01-25 ENCOUNTER — EMERGENCY (EMERGENCY)
Facility: HOSPITAL | Age: 82
LOS: 0 days | Discharge: HOME | End: 2022-01-25
Attending: EMERGENCY MEDICINE
Payer: MEDICARE

## 2022-01-25 ENCOUNTER — TRANSCRIPTION ENCOUNTER (OUTPATIENT)
Age: 82
End: 2022-01-25

## 2022-01-25 VITALS
RESPIRATION RATE: 18 BRPM | SYSTOLIC BLOOD PRESSURE: 135 MMHG | TEMPERATURE: 97 F | DIASTOLIC BLOOD PRESSURE: 89 MMHG | HEART RATE: 75 BPM | OXYGEN SATURATION: 99 %

## 2022-01-25 VITALS — HEIGHT: 70 IN | WEIGHT: 169.98 LBS

## 2022-01-25 DIAGNOSIS — Z90.89 ACQUIRED ABSENCE OF OTHER ORGANS: Chronic | ICD-10-CM

## 2022-01-25 DIAGNOSIS — Z88.6 ALLERGY STATUS TO ANALGESIC AGENT: ICD-10-CM

## 2022-01-25 DIAGNOSIS — R11.0 NAUSEA: ICD-10-CM

## 2022-01-25 DIAGNOSIS — Z79.82 LONG TERM (CURRENT) USE OF ASPIRIN: ICD-10-CM

## 2022-01-25 DIAGNOSIS — Z88.1 ALLERGY STATUS TO OTHER ANTIBIOTIC AGENTS STATUS: ICD-10-CM

## 2022-01-25 DIAGNOSIS — Z98.890 OTHER SPECIFIED POSTPROCEDURAL STATES: Chronic | ICD-10-CM

## 2022-01-25 DIAGNOSIS — R19.7 DIARRHEA, UNSPECIFIED: ICD-10-CM

## 2022-01-25 DIAGNOSIS — R50.9 FEVER, UNSPECIFIED: ICD-10-CM

## 2022-01-25 DIAGNOSIS — U07.1 COVID-19: ICD-10-CM

## 2022-01-25 LAB
ALBUMIN SERPL ELPH-MCNC: 3.6 G/DL — SIGNIFICANT CHANGE UP (ref 3.3–5)
ALP SERPL-CCNC: 76 U/L — SIGNIFICANT CHANGE UP (ref 40–120)
ALT FLD-CCNC: 54 U/L — SIGNIFICANT CHANGE UP (ref 12–78)
ANION GAP SERPL CALC-SCNC: 3 MMOL/L — LOW (ref 5–17)
AST SERPL-CCNC: 37 U/L — SIGNIFICANT CHANGE UP (ref 15–37)
BASOPHILS # BLD AUTO: 0.05 K/UL — SIGNIFICANT CHANGE UP (ref 0–0.2)
BASOPHILS NFR BLD AUTO: 0.3 % — SIGNIFICANT CHANGE UP (ref 0–2)
BILIRUB SERPL-MCNC: 0.9 MG/DL — SIGNIFICANT CHANGE UP (ref 0.2–1.2)
BUN SERPL-MCNC: 19 MG/DL — SIGNIFICANT CHANGE UP (ref 7–23)
CALCIUM SERPL-MCNC: 9.2 MG/DL — SIGNIFICANT CHANGE UP (ref 8.5–10.1)
CHLORIDE SERPL-SCNC: 107 MMOL/L — SIGNIFICANT CHANGE UP (ref 96–108)
CO2 SERPL-SCNC: 26 MMOL/L — SIGNIFICANT CHANGE UP (ref 22–31)
CREAT SERPL-MCNC: 1 MG/DL — SIGNIFICANT CHANGE UP (ref 0.5–1.3)
D DIMER BLD IA.RAPID-MCNC: 222 NG/ML DDU — SIGNIFICANT CHANGE UP
EOSINOPHIL # BLD AUTO: 0.11 K/UL — SIGNIFICANT CHANGE UP (ref 0–0.5)
EOSINOPHIL NFR BLD AUTO: 0.7 % — SIGNIFICANT CHANGE UP (ref 0–6)
FLUAV AG NPH QL: SIGNIFICANT CHANGE UP
FLUBV AG NPH QL: SIGNIFICANT CHANGE UP
GLUCOSE SERPL-MCNC: 111 MG/DL — HIGH (ref 70–99)
HCT VFR BLD CALC: 44 % — SIGNIFICANT CHANGE UP (ref 39–50)
HGB BLD-MCNC: 14.4 G/DL — SIGNIFICANT CHANGE UP (ref 13–17)
IMM GRANULOCYTES NFR BLD AUTO: 0.5 % — SIGNIFICANT CHANGE UP (ref 0–1.5)
LYMPHOCYTES # BLD AUTO: 0.86 K/UL — LOW (ref 1–3.3)
LYMPHOCYTES # BLD AUTO: 5.6 % — LOW (ref 13–44)
MCHC RBC-ENTMCNC: 29.9 PG — SIGNIFICANT CHANGE UP (ref 27–34)
MCHC RBC-ENTMCNC: 32.7 GM/DL — SIGNIFICANT CHANGE UP (ref 32–36)
MCV RBC AUTO: 91.3 FL — SIGNIFICANT CHANGE UP (ref 80–100)
MONOCYTES # BLD AUTO: 0.84 K/UL — SIGNIFICANT CHANGE UP (ref 0–0.9)
MONOCYTES NFR BLD AUTO: 5.5 % — SIGNIFICANT CHANGE UP (ref 2–14)
NEUTROPHILS # BLD AUTO: 13.39 K/UL — HIGH (ref 1.8–7.4)
NEUTROPHILS NFR BLD AUTO: 87.4 % — HIGH (ref 43–77)
PLATELET # BLD AUTO: 233 K/UL — SIGNIFICANT CHANGE UP (ref 150–400)
POTASSIUM SERPL-MCNC: 4 MMOL/L — SIGNIFICANT CHANGE UP (ref 3.5–5.3)
POTASSIUM SERPL-SCNC: 4 MMOL/L — SIGNIFICANT CHANGE UP (ref 3.5–5.3)
PROT SERPL-MCNC: 7.1 GM/DL — SIGNIFICANT CHANGE UP (ref 6–8.3)
RBC # BLD: 4.82 M/UL — SIGNIFICANT CHANGE UP (ref 4.2–5.8)
RBC # FLD: 14.6 % — HIGH (ref 10.3–14.5)
RSV RNA NPH QL NAA+NON-PROBE: SIGNIFICANT CHANGE UP
SARS-COV-2 RNA SPEC QL NAA+PROBE: DETECTED
SODIUM SERPL-SCNC: 136 MMOL/L — SIGNIFICANT CHANGE UP (ref 135–145)
WBC # BLD: 15.32 K/UL — HIGH (ref 3.8–10.5)
WBC # FLD AUTO: 15.32 K/UL — HIGH (ref 3.8–10.5)

## 2022-01-25 PROCEDURE — 99283 EMERGENCY DEPT VISIT LOW MDM: CPT | Mod: 25

## 2022-01-25 PROCEDURE — 36415 COLL VENOUS BLD VENIPUNCTURE: CPT

## 2022-01-25 PROCEDURE — 71045 X-RAY EXAM CHEST 1 VIEW: CPT

## 2022-01-25 PROCEDURE — 0241U: CPT

## 2022-01-25 PROCEDURE — 85379 FIBRIN DEGRADATION QUANT: CPT

## 2022-01-25 PROCEDURE — 99284 EMERGENCY DEPT VISIT MOD MDM: CPT | Mod: 25

## 2022-01-25 PROCEDURE — 80053 COMPREHEN METABOLIC PANEL: CPT

## 2022-01-25 PROCEDURE — 71045 X-RAY EXAM CHEST 1 VIEW: CPT | Mod: 26

## 2022-01-25 PROCEDURE — 85025 COMPLETE CBC W/AUTO DIFF WBC: CPT

## 2022-01-25 PROCEDURE — 99284 EMERGENCY DEPT VISIT MOD MDM: CPT | Mod: FS,CS

## 2022-01-25 NOTE — ED ADULT TRIAGE NOTE - CHIEF COMPLAINT QUOTE
c/o low oxygen level at home, O2 sat at home 80%-87% on RA, patient states his normal O2 sat is 93% on RA, c/o fever last night, Tmax 101, c/o nausea, diarrhea, abdominal pain and chills, has been exposed to wife who was diagnosed with covid, called PMD and was told to come to ER, O2 sat in triage 89% to 93% on RA, pulse 66, c/o poor appetite HX; bladder control problems, high cholesterol, GERD, patient spouse Taylor Thurston#[phone] cell#[phone], denies difficulty breathing or pain

## 2022-01-25 NOTE — ED STATDOCS - OBJECTIVE STATEMENT
82 y/o male with a PMHx of CAD s/p stent x2, colon adenoma, gastritis, HLD presents to the ED c/o fever, nausea, diarrhea, and low oxygen between 80-90% x2 days. Pt reports baseline is 93% on RA. Pt reports he called Dr. Amaral after having an "uncomfortable night" who told him to come to the ED for evaluation. Pt has positive COVID exposure via wife. Pt ia vaccinated for COVID and the flu. +SOB with exertion. Never smoker. pt has not been tested for COVID recently.

## 2022-01-25 NOTE — ED ADULT NURSE NOTE - CHIEF COMPLAINT QUOTE
c/o low oxygen level at home, O2 sat at home 80%-87% on RA, patient states his normal O2 sat is 93% on RA, c/o fever last night, Tmax 101, c/o nausea, diarrhea, abdominal pain and chills, has been exposed to wife who was diagnosed with covid, called PMD and was told to come to ER, O2 sat in triage 89% to 93% on RA, pulse 66, c/o poor appetite HX; bladder control problems, high cholesterol, GERD, patient spouse Taylor Ringsted#[phone] cell#[phone], denies difficulty breathing or pain

## 2022-01-25 NOTE — ED STATDOCS - PROGRESS NOTE DETAILS
pt aware of labs and imaging results and, filled out form for [facility] outpt setting, pt knows to fu covid results and return to ED if O2 is less then 90%RA and any other worsening of symptoms. pt well appearing on dc and agrees with plan. -Edith Guo PA-C

## 2022-01-25 NOTE — ED ADULT TRIAGE NOTE - DATE OF FIRST COVID-19 BOOSTER
I have personally performed a face to face diagnostic evaluation on this patient. I have reviewed the ACP note and agree with the history, exam and plan of care, except as noted. 04-Oct-2021

## 2022-01-25 NOTE — ED STATDOCS - NSFOLLOWUPINSTRUCTIONS_ED_ALL_ED_FT
pt is well appearing on dc, pt advised to stay home quarantine and to fu with testing results, pt knows to return to ed for any worsening of symptoms including chest pain, sob, dyspnea or any other complaints. pt advised to increase hydration and fluids and to take  Tylenol for symptoms if needed. pt knows to return to ed for any worsening of symptoms.

## 2022-01-25 NOTE — ED STATDOCS - NSICDXPASTMEDICALHX_GEN_ALL_CORE_FT
PAST MEDICAL HISTORY:  CAD (coronary artery disease)     Colon adenoma     Gastritis     H/O chest pain June 2019- later to be determined to be gastritis    Hearing deficit wears b/l hearing aids    Heartburn     HLD (hyperlipidemia)     Hyperlipemia     Melanoma right cheek    Mild obstructive sleep apnea pt reports "boderline sleep apnea", no current use of CPAP    Overactive bladder     S/P coronary artery stent placement lad x2    Sleep trouble

## 2022-01-25 NOTE — ED STATDOCS - CLINICAL SUMMARY MEDICAL DECISION MAKING FREE TEXT BOX
D-dimer WNL.  CXR clear.  Patient with normal O2 sats in ED, no tachypnea, no respiratory distress.  Will refer for monoclonal antibiotics.  Strict return precautions given.

## 2022-01-25 NOTE — ED STATDOCS - PATIENT PORTAL LINK FT
You can access the FollowMyHealth Patient Portal offered by [facility] by registering at the following website: [email or web address]. By joining [facility]’s FollowMyHealth portal, you will also be able to view your health information using other applications (apps) compatible with our system.

## 2022-01-26 ENCOUNTER — APPOINTMENT (OUTPATIENT)
Dept: DISASTER EMERGENCY | Facility: HOSPITAL | Age: 82
End: 2022-01-26

## 2022-01-26 ENCOUNTER — OUTPATIENT (OUTPATIENT)
Dept: OUTPATIENT SERVICES | Facility: HOSPITAL | Age: 82
LOS: 1 days | End: 2022-01-26
Payer: MEDICARE

## 2022-01-26 VITALS
HEIGHT: 69 IN | OXYGEN SATURATION: 96 % | WEIGHT: 169.98 LBS | HEART RATE: 70 BPM | RESPIRATION RATE: 16 BRPM | SYSTOLIC BLOOD PRESSURE: 137 MMHG | TEMPERATURE: 98 F | DIASTOLIC BLOOD PRESSURE: 70 MMHG

## 2022-01-26 VITALS
DIASTOLIC BLOOD PRESSURE: 79 MMHG | RESPIRATION RATE: 16 BRPM | HEART RATE: 67 BPM | TEMPERATURE: 99 F | SYSTOLIC BLOOD PRESSURE: 138 MMHG | OXYGEN SATURATION: 97 %

## 2022-01-26 DIAGNOSIS — U07.1 COVID-19: ICD-10-CM

## 2022-01-26 DIAGNOSIS — Z90.89 ACQUIRED ABSENCE OF OTHER ORGANS: Chronic | ICD-10-CM

## 2022-01-26 DIAGNOSIS — Z98.890 OTHER SPECIFIED POSTPROCEDURAL STATES: Chronic | ICD-10-CM

## 2022-01-26 PROCEDURE — M0247: CPT

## 2022-01-26 RX ORDER — SODIUM CHLORIDE 9 MG/ML
250 INJECTION INTRAMUSCULAR; INTRAVENOUS; SUBCUTANEOUS
Refills: 0 | Status: COMPLETED | OUTPATIENT
Start: 2022-01-26 | End: 2022-01-26

## 2022-01-26 RX ORDER — SOTROVIMAB 62.5 MG/ML
500 INJECTION, SOLUTION, CONCENTRATE INTRAVENOUS ONCE
Refills: 0 | Status: COMPLETED | OUTPATIENT
Start: 2022-01-26 | End: 2022-01-26

## 2022-01-26 RX ADMIN — SODIUM CHLORIDE 100 MILLILITER(S): 9 INJECTION INTRAMUSCULAR; INTRAVENOUS; SUBCUTANEOUS at 11:09

## 2022-01-26 RX ADMIN — SOTROVIMAB 116 MILLIGRAM(S): 62.5 INJECTION, SOLUTION, CONCENTRATE INTRAVENOUS at 10:55

## 2022-01-26 NOTE — CHART NOTE - NSCHARTNOTEFT_GEN_A_CORE
CC: Monoclonal Antibody Infusion - COVID 19 Positive or significant COVID exposure       History: Patient presents for infusion of monoclonal antibody infusion. Patient has been screened and was deemed to be a candidate.    Date tested positive: 1/25/22      COVID Symptoms:  Date of onset: 1/24/22  Nausea  Diarrhea  Fatigue  Cough  Body Aches      Risk Profile includes:   Age      Vaccination Status: Pfizer x 3        clindamycin (Other; Rash)  morphine (Other)            PMHx:  Infection due to severe acute respiratory syndrome coronavirus 2 (SARS-CoV-2)    No pertinent family history in first degree relatives    CAD (coronary artery disease)    Colon adenoma    Heartburn    Hyperlipemia    S/P coronary artery stent placement    HTN (hypertension)    HLD (hyperlipidemia)    Hearing deficit    H/O chest pain    Gastritis    Melanoma    Overactive bladder    Sleep trouble    Mild obstructive sleep apnea    History of lumbar laminectomy    History of repair of right rotator cuff    Status post tonsillectomy          T(C): 36.3 (01-25-22 @ 21:19), Max: 36.9 (01-25-22 @ 19:31)  HR: 75 (01-25-22 @ 21:19) (75 - 79)  BP: 135/89 (01-25-22 @ 21:19) (133/89 - 135/89)  RR: 18 (01-25-22 @ 21:19) (18 - 18)  SpO2: 99% (01-25-22 @ 21:19) (98% - 99%)      PE- Well developed, well-nourish, resting comfortably in NAD.   Cardiac- +regular rate and rhythm.   Pulm- Normal rate.  No distress.  Abd soft and non-tender.   Neuro- A&Ox3, no gross sensory deficits to light touch or motor weaknesses.   Vasc- No peripheral edema or venous stasis noted.  Skin- No ecchymosis or bleeding.  MS- No bony tenderness       ASSESSMENT:  Pt is a 81y year old Male COVID positive and symptomatic who was referred to the infusion center for Monoclonal antibody infusion.      PLAN:  - infusion procedure explained to patient   - Consent for monoclonal antibody infusion obtained   - Risk & benefits discussed/all questions answered  - Monoclonal Antibody Infusion  - will observe patient for one hour post infusion  and then if stable discharge home with outpatient follow up as planned by PMD.    POST INFUSION ASSESSMENT:   DISCHARGE at approximately  XXX  hours    - Patient tolerated infusion well denies complaints of chest pain, SOB, dizziness or palpitations  - VSS for discharge home  - D/C instructions given/ fact sheet included.  - Patient to follow-up with PCP as needed.                    positive yester  obset day before  s/s nausea, diarrhea, fatigue  booster x CC: Monoclonal Antibody Infusion - COVID 19 Positive or significant COVID exposure       History: Patient presents for infusion of monoclonal antibody infusion. Patient has been screened and was deemed to be a candidate.    Date tested positive: 1/25/22      COVID Symptoms:  Date of onset: 1/24/22  Nausea  Diarrhea  Fatigue  Cough  Body Aches      Risk Profile includes:   Age      Vaccination Status: Pfizer x 3        clindamycin (Other; Rash)  morphine (Other)            PMHx:  Infection due to severe acute respiratory syndrome coronavirus 2 (SARS-CoV-2)    No pertinent family history in first degree relatives    CAD (coronary artery disease)    Colon adenoma    Heartburn    Hyperlipemia    S/P coronary artery stent placement    HTN (hypertension)    HLD (hyperlipidemia)    Hearing deficit    H/O chest pain    Gastritis    Melanoma    Overactive bladder    Sleep trouble    Mild obstructive sleep apnea    History of lumbar laminectomy    History of repair of right rotator cuff    Status post tonsillectomy          Vital Signs Last 24 Hrs  T(C): 36.9 (26 Jan 2022 10:37), Max: 36.9 (25 Jan 2022 19:31)  T(F): 98.4 (26 Jan 2022 10:37), Max: 98.5 (25 Jan 2022 19:31)  HR: 70 (26 Jan 2022 10:37) (70 - 79)  BP: 135/89 (25 Jan 2022 21:19) (133/89 - 135/89)  BP(mean): --  RR: 18 (25 Jan 2022 21:19) (18 - 18)  SpO2: 99% (25 Jan 2022 21:19) (98% - 99%)        PE- Well developed, well-nourish, resting comfortably in NAD.   Cardiac- +regular rate and rhythm.   Pulm- Normal rate.  No distress.  Abd soft and non-tender.   Neuro- A&Ox3, no gross sensory deficits to light touch or motor weaknesses.   Vasc- No peripheral edema or venous stasis noted.  Skin- No ecchymosis or bleeding.  MS- No bony tenderness       ASSESSMENT:  Pt is a 81y year old Male COVID positive and symptomatic who was referred to the infusion center for Monoclonal antibody infusion.      PLAN:  - infusion procedure explained to patient   - Consent for monoclonal antibody infusion obtained   - Risk & benefits discussed/all questions answered  - Monoclonal Antibody Infusion  - will observe patient for one hour post infusion  and then if stable discharge home with outpatient follow up as planned by PMD.    POST INFUSION ASSESSMENT:   DISCHARGE at approximately  12:30  hours    - Patient tolerated infusion well denies complaints of chest pain, SOB, dizziness or palpitations  - VSS for discharge home  - D/C instructions given/ fact sheet included.  - Patient to follow-up with PCP as needed.

## 2022-01-27 NOTE — ED STATDOCS - NSICDXPASTSURGICALHX_GEN_ALL_CORE_FT
Yes... PAST SURGICAL HISTORY:  History of lumbar laminectomy     History of repair of right rotator cuff     Status post tonsillectomy        No

## 2022-02-08 ENCOUNTER — APPOINTMENT (OUTPATIENT)
Dept: SURGERY | Facility: CLINIC | Age: 82
End: 2022-02-08
Payer: MEDICARE

## 2022-02-08 DIAGNOSIS — D03.30 MELANOMA IN SITU OF UNSPECIFIED PART OF FACE: ICD-10-CM

## 2022-02-08 PROCEDURE — 99213 OFFICE O/P EST LOW 20 MIN: CPT

## 2022-02-08 NOTE — HISTORY OF PRESENT ILLNESS
[de-identified] : 2 1/2 years  s/p resection right cheek MIS. denies pain, bleeding, drainage, infection or new lesions.  no changes medically since last visit  .  seen by dermatologist recently (dr Gu) who did not notice any lesions.  I have reviewed all old and new data and available images.

## 2022-02-08 NOTE — ASSESSMENT
[FreeTextEntry1] : will observe. to continue to see dr Gu on regular basis.  to return earlier if any change.  patient has been given the opportunity to ask questions, and all of the patient's questions have been answered to their satisfaction

## 2022-05-01 NOTE — ASU PATIENT PROFILE, ADULT - NSALCOHOLFREQ_GEN_A_CORE_SD
[facility]  INFECTIOUS DISEASES AND INTERNAL MEDICINE at [place] and [place]  =======================================================                               J Carlos Galdamez MD#  Negrito Caro MD*                                     Isatu Rojas MD*    Perlita Solares MD*            Diplomates American Board of Internal Medicine & Infectious Diseases                # [place] Office - Appt - Tel  [phone] Fax [phone]                * [place] Office - Appt - Tel [phone] Fax [phone]                                  Hospital Consult line:  [phone]  =======================================================    JU FERGUSON [id number]    Follow up: COVID 19     Remains on O2   No complaints this AM    s/p BRONCH 4/29      Allergies:  budesonide (Unknown)  cefpodoxime (Unknown)  Pollen (Unknown)       REVIEW OF SYSTEMS:  CONSTITUTIONAL:  No Fever or chills  HEENT:  No diplopia or blurred vision.  No earache, sore throat or runny nose.  CARDIOVASCULAR:  No pressure, squeezing, strangling, tightness, heaviness or aching about the chest, neck, axilla or epigastrium.  RESPIRATORY:  No cough. + shortness of breath  GASTROINTESTINAL:  No nausea, vomiting or diarrhea.  GENITOURINARY:  No dysuria, frequency or urgency.   MUSCULOSKELETAL:  no joint aches, no muscle pain  SKIN:  No change in skin, hair or nails.  NEUROLOGIC:  No Headaches, seizures   PSYCHIATRIC:  No disorder of thought or mood.  ENDOCRINE:  No heat or cold intolerance  HEMATOLOGICAL:  No easy bruising or bleeding.       Physical Exam:  GEN: NAD ON NC 02  HEENT: normocephalic and atraumatic. EOMI. PERRL.    NECK: Supple.   LUNGS: Decreased Basal BS B/L   HEART: RRR  ABDOMEN: Soft, NT, ND.  +BS.    : No CVA tenderness  EXTREMITIES: Without  edema.  MSK: No joint swelling  NEUROLOGIC: AAOx3  PSYCHIATRIC: Appropriate affect .  SKIN: No rash      Vitals: Vital Signs Last 24 Hrs  T(C): 36.4 (01 May 2022 04:15), Max: 36.6 (30 Apr 2022 21:33)  T(F): 97.5 (01 May 2022 04:15), Max: 97.9 (30 Apr 2022 21:33)  HR: 86 (01 May 2022 04:15) (83 - 92)  BP: 110/54 (01 May 2022 04:15) (103/55 - 110/54)  BP(mean): 68 (30 Apr 2022 18:23) (66 - 68)  RR: 18 (01 May 2022 04:15) (12 - 18)  SpO2: 99% (01 May 2022 04:15) (99% - 100%)      Current Antibiotics:  doxycycline hyclate Capsule 100 milliGRAM(s) Oral every 12 hours  fluconAZOLE   Tablet 200 milliGRAM(s) Oral <User Schedule>  meropenem  IVPB 500 milliGRAM(s) IV Intermittent every 24 hours  remdesivir  IVPB   IV Intermittent   remdesivir  IVPB 100 milliGRAM(s) IV Intermittent every 24 hours  trimethoprim   80 mG/sulfamethoxazole 400 mG 1 Tablet(s) Oral <User Schedule>    Other medications:  atorvastatin 40 milliGRAM(s) Oral at bedtime  chlorhexidine 2% Cloths 1 Application(s) Topical <User Schedule>  methylPREDNISolone sodium succinate Injectable 40 milliGRAM(s) IV Push every 8 hours  multivitamin 1 Tablet(s) Oral daily  mycophenolate mofetil 500 milliGRAM(s) Oral two times a day  pantoprazole    Tablet 40 milliGRAM(s) Oral before breakfast  sevelamer carbonate 1600 milliGRAM(s) Oral three times a day with meals  tacrolimus 1.5 milliGRAM(s) Oral <User Schedule>  tacrolimus 1 milliGRAM(s) Oral <User Schedule>  tamsulosin 0.4 milliGRAM(s) Oral at bedtime                                        9.0    4.36  )-----------( 168      ( 30 Apr 2022 10:38 )             27.1   05-01    137  |  98  |  78.5<H>  ----------------------------<  207<H>  5.5<H>   |  22.0  |  5.74<H>    Ca    8.0<L>      01 May 2022 07:44    TPro  5.4<L>  /  Alb  2.9<L>  /  TBili  0.7  /  DBili  x   /  AST  62<H>  /  ALT  16  /  AlkPhos  328<H>  05-01      RECENT CULTURES:  04-26 @ 14:29 .Blood Blood-Peripheral     No growth at 48 hours    04-26 @ 14:28 .Blood Blood-Peripheral     No growth at 48 hours      WBC Count: 6.04 K/uL (04-29-22 @ 07:12)  WBC Count: 7.40 K/uL (04-28-22 @ 07:53)  WBC Count: 5.17 K/uL (04-27-22 @ 11:53)  WBC Count: 5.85 K/uL (04-27-22 @ 05:42)  WBC Count: 8.83 K/uL (04-26-22 @ 14:30)    Creatinine, Serum: 6.87 mg/dL (04-29-22 @ 07:12)  Creatinine, Serum: 4.82 mg/dL (04-28-22 @ 07:53)  Creatinine, Serum: 6.57 mg/dL (04-27-22 @ 05:42)  Creatinine, Serum: 5.19 mg/dL (04-26-22 @ 14:30)    Procalcitonin, Serum: 13.09 ng/mL (04-26-22 @ 17:02)     SARS-CoV-2: Detected (04-26-22 @ 14:31)  COVID-19 PCR: NotDetec (04-20-22 @ 22:05)          < from: CT Chest No Cont (04.28.22 @ 01:53) >  ACC: [id number] EXAM:  CT ABDOMEN AND PELVIS                        ACC: [id number] EXAM:  CT CHEST                          PROCEDURE DATE:  04/28/2022      INTERPRETATION:  CLINICAL INFORMATION: Acute hypoxic respiratory failure.  History of lungtransplants.  Transaminitis.    COMPARISON: CT scan chest 8/20/2021.    CONTRAST/COMPLICATIONS:  IV Contrast: NONE  Oral Contrast: NONE  Complications: None reported at time of study completion    PROCEDURE:  CT of the Chest, Abdomen and Pelvis wasperformed.  Sagittal and coronal reformats were performed.    FINDINGS:    CHEST:    LUNGS AND LARGE AIRWAYS: PLEURA:  Small bilateral pleural effusions, including small loculated components   within the bilateral major fissures.  There are patchy bilateral lower lobe airspace consolidations, more   pronounced in the right lower lobe.    There is mild groundglass haziness right middle and lower lobes.     There is marked narrowing of the right upper lobe bronchus at the level   of surgical clips.  There is collapse of the bronchus intermedius and segmental right middle   and lower lobe bronchi.  There is mild narrowing of the left upper lobe bronchus at the level of   surgical clips.    VESSELS: Atherosclerotic changes thoracic aorta and coronary artery   calcifications.  Right-sided central venous catheter, tip at the distal SVC.    HEART:  The heart is enlarged.  Mitral valvular calcifications.  Calcifications at the aortic root.   Minimal pericardial effusion/thickening.    MEDIASTINUMAND JANNY:  Small subcentimeter short axis precarinal lymph nodes, stable.  Calcified subcarinal mediastinal lymph node, stable.  The evaluation of the pulmonary hilum is limited without intravenous   contrast.    CHEST WALL AND LOWER NECK:  Sternotomy.    ABDOMEN AND PELVIS:  Streak artifact degrades image quality.    The evaluation of the solid organ parenchyma is limited without   intravenous contrast.    LIVER: Within normal limits.  BILE DUCTS: Normal caliber.  GALLBLADDER: Within normal limits.  SPLEEN: Within normal limits.  PANCREAS: Within normal limits.  ADRENALS: Within normal limits.  KIDNEYS/URETERS:  Bilateral atrophic kidneys with intrarenal vascular calcifications.  Partially duplicated left renal collecting system, no hydronephrosis.  Small layering stones distal right ureter, without hydroureter.    Metallic streak artifact related to patient's orthopedic hardware   degrades image quality limiting evaluation of the pelvis.  BLADDER: Small intraluminal calcification.  REPRODUCTIVE ORGANS: Limited.    BOWEL:  Colonic fecal retention with moderate distention of the rectum, no bowel   obstruction.  Sigmoid diverticulosis.  PERITONEUM:  Trace ascites.  Mild stranding within the bilateral posterior flanks.  No localized intra-abdominal fluid collection.    VESSELS: Atherosclerotic changes.  Infrarenal abdominal aortic dilatation measuring 2.6 cm.  RETROPERITONEUM/LYMPH NODES: No lymphadenopathy.  No retroperitoneal hematoma.    ABDOMINAL WALL: Subcutaneous edema.    BONES:  Multilevel age indeterminate compression fracture deformities mid to   lower thoracic vertebral bodies, as well as L1 L2 L3 and L4 vertebral   bodies.    Comminuted left acetabular fracture, extending superiorly into the   suprasellar acetabular iliac bone, and puboacetabular junction. This   places left inferior pubic ramus fracture.    Incompletely imaged right hip arthroplasty and intramedullary krista left   proximal femur.    IMPRESSION:    Bilateral airspace consolidations, more pronounced right lower lobe,   findings which may reflect atelectasis and/or pneumonia.  Small bilateral pleural effusions.    Marked narrowing of the right upper lobe bronchus and mild narrowing of   the left upper lobe bronchus at the level of the surgical clips,   correlate clinically for bronchial stenosis.    Collapse of the bronchus intermedius and segmental right middle and lower   lobe bronchi, may be secondary to secretions/mucoid impaction.  Recommend further clinical correlation and follow-up CT exam to exclude   an obstructive pneumonitis/atelectasis secondary to underlying mass.    Small layering calcifications distal right ureter with small intraluminal   bladder calcification; no hydronephrosis.    Other findings as discussed above.    This study was preliminary reported by the ED radiologist on 4/28/2022.    < end of copied text >                 4 or more times/wk

## 2022-08-13 NOTE — ED STATDOCS - OBJECTIVE STATEMENT
Pt presents to ED for covid swab. +exposure. No complaints at this time  Pt here for testing.
2 seconds or less

## 2023-01-18 ENCOUNTER — NON-APPOINTMENT (OUTPATIENT)
Age: 83
End: 2023-01-18

## 2023-01-18 ENCOUNTER — APPOINTMENT (OUTPATIENT)
Dept: SPINE | Facility: CLINIC | Age: 83
End: 2023-01-18
Payer: MEDICARE

## 2023-01-18 VITALS
BODY MASS INDEX: 26.22 KG/M2 | TEMPERATURE: 97.4 F | OXYGEN SATURATION: 98 % | HEIGHT: 69 IN | DIASTOLIC BLOOD PRESSURE: 75 MMHG | WEIGHT: 177 LBS | HEART RATE: 100 BPM | SYSTOLIC BLOOD PRESSURE: 124 MMHG

## 2023-01-18 DIAGNOSIS — M51.34 OTHER INTERVERTEBRAL DISC DEGENERATION, THORACIC REGION: ICD-10-CM

## 2023-01-18 DIAGNOSIS — M54.9 DORSALGIA, UNSPECIFIED: ICD-10-CM

## 2023-01-18 PROCEDURE — 99203 OFFICE O/P NEW LOW 30 MIN: CPT

## 2023-01-18 NOTE — PHYSICAL EXAM
[General Appearance - Alert] : alert [General Appearance - Well Nourished] : well nourished [General Appearance - Well-Appearing] : healthy appearing [Oriented To Time, Place, And Person] : oriented to person, place, and time [Person] : oriented to person [Place] : oriented to place [Time] : oriented to time [No Visual Abnormalities] : no visible abnormailities [Sclera] : the sclera and conjunctiva were normal [] : no respiratory distress [Exaggerated Use Of Accessory Muscles For Inspiration] : no accessory muscle use [No Spinal Tenderness] : no spinal tenderness [Abnormal Walk] : normal gait [Skin Color & Pigmentation] : normal skin color and pigmentation

## 2023-01-19 ENCOUNTER — APPOINTMENT (OUTPATIENT)
Dept: RHEUMATOLOGY | Facility: CLINIC | Age: 83
End: 2023-01-19
Payer: MEDICARE

## 2023-01-19 VITALS
HEART RATE: 86 BPM | HEIGHT: 69 IN | OXYGEN SATURATION: 96 % | BODY MASS INDEX: 25.62 KG/M2 | WEIGHT: 173 LBS | TEMPERATURE: 98.4 F | DIASTOLIC BLOOD PRESSURE: 70 MMHG | SYSTOLIC BLOOD PRESSURE: 120 MMHG

## 2023-01-19 DIAGNOSIS — M80.08XD AGE-RELATED OSTEOPOROSIS WITH CURRENT PATHOLOGICAL FRACTURE, VERTEBRA(E), SUBSEQUENT ENCOUNTER FOR FRACTURE WITH ROUTINE HEALING: ICD-10-CM

## 2023-01-19 PROCEDURE — 99204 OFFICE O/P NEW MOD 45 MIN: CPT

## 2023-01-20 ENCOUNTER — NON-APPOINTMENT (OUTPATIENT)
Age: 83
End: 2023-01-20

## 2023-01-22 PROBLEM — M80.08XD: Status: ACTIVE | Noted: 2023-01-22

## 2023-01-22 NOTE — CONSULT LETTER
[Dear  ___] : Dear  [unfilled], [Consult Letter:] : I had the pleasure of evaluating your patient, [unfilled]. [Please see my note below.] : Please see my note below. [Consult Closing:] : Thank you very much for allowing me to participate in the care of this patient.  If you have any questions, please do not hesitate to contact me. [Sincerely,] : Sincerely, [FreeTextEntry1] : Please see below for summary of  recent rheumatology evaluation and recommendations.\par \par 81 yo with hx of LBP s/p fusion x 2 w/ diffuse OA, GERD and Prostate cancer (controlled x many ys)\par  c/o NEW onset thoracic back pain and recent dx compression fractures w/ dx of osteoporosis.   \par \par 1) Clinical osteoporosis w/ compression fractures x 3:  presented w/ sudden thoracic back pain. \par Baseline 11/17/22 Dexa w/ most severe T score at RFN -1.7, -1.5 at LFN and Wrist scores nl 0.5 at radius total, spine not done 2/2 spinal fusion.  \par Frax overall 8.3% risk, 3.1% risk hip fracture with 2 stress fractures w/ marrow edema at time of measuring) in T spine at T7, T8 & T10\par Risk factors:  Prostate Ca (need hx of treatment), GERD, advanced age\par No previous dx or treatment till recent.  Started Tymlos 12/22 tolerated fine.. struggling with financial coverage\par Denies FH, or other risk factors... \par No renal calculi or hx of radiation tx  \par \par Discussion:  treatment decision, previously active healthy 81 yo with 3 compression fractures, not associated with trauma suggests fragility fractures and warrants aggressive treatment at least initially.  Would recommend 1 year treatment w/ Tymlos (or other PTH analog) followed by IV BP treatment (GERD severe) and follow clinically/ dexa reports closely.  Concerned about degree of pain and need for opiates to control- as result has decreased function which would only increase risk for fractures. Needs to resume wt bearing exercise regimen now. Will  start with PT.\par Needs comprehensive metabolic w/u to include SPEP and PSA, testosterone given hx of Prostate Ca (done at Dr Lou's office - need to review and add anything if necessary).  \par \par 2) OA multisite:  \par - LBP s/p spinal fusion x 2 doing well \par - Thoracic:  diffuse DJD throughout - should consider interventional tx to minimze use of opiates, at least discuss with interventional PM.. for now continue opiates and start PT for strengthening.. and resumption of routine exercise regimen for back health \par NOTE: \par - 12/22 CTscan/ MR thoracic spine as noted.. seen by surgeon, not sx candidate at this time Dr Jarvis  \par \par 3) GERD: takes pantoprazole 20 mg daily.....gets indigestion when he doesn't take it for 2-3 days...last endoscopy was in Oct or Nov 2022....he had irritation in the esophageal area but no ulcerations\par \par 4) Prostate Ca:  need details \par \par \par Plan\par \par - f/u w/pain management for additional treatment for back pain\par \par - start PT now to reestablish  Ideally 30-45 mins moderate-intense wt bearing/ cardiovascular and strength training for optimal bone health    \par \par - Maintain 7396-4917 mg Ca / 2000 IU vit daily intake between diet and supplement (average diet with dairy = 800 mg calcium) with goal mnt Vit D between 30-50 for optimal bone health.  \par If unclear search "calcium content of food"  and calculate your average daily intake, then supplement as needed to achieve 3463-8632 mg.  Higher doses can cause kidney stones and accelerate atherosclerosis (plaque) on your blood vessels.\par \par - contacting [employer] for assistance w /tymlos copay, call office if you haven't heard from anyone in 1-2 wks  \par \par - keep 3/15/23 appt to confirm treatment and follow response to PT.. [FreeTextEntry2] : See Amaral MD  [FreeTextEntry3] : Rossi Teixeira DNP, ANP-C\par Division or Rheumatology\par [facility]\par \par

## 2023-01-22 NOTE — ASSESSMENT
[FreeTextEntry1] : 81 yo with hx of LBP s/p fusion x 2 w/ diffuse OA, GERD and Prostate cancer (controlled x many ys)\par  c/o NEW onset thoracic back pain and recent dx compression fractures w/ dx of osteoporosis.   \par \par 1) Clinical osteoporosis w/ compression fractures x 3:  presented w/ sudden thoracic back pain. \par Baseline 11/17/22 Dexa w/ most severe T score at RFN -1.7, -1.5 at LFN and Wrist scores nl 0.5 at radius total, spine not done 2/2 spinal fusion.  \par Frax overall 8.3% risk, 3.1% risk hip fracture with 2 stress fractures w/ marrow edema at time of measuring) in T spine at T7, T8 & T10\par Risk factors:  Prostate Ca (need hx of treatment), GERD, advanced age\par No previous dx or treatment till recent.  Started Tymlos 12/22 tolerated fine.. struggling with financial coverage\par Denies FH, or other risk factors... \par No renal calculi or hx of radiation tx  \par \par Discussion:  treatment decision, previously active healthy 81 yo with 3 compression fractures, not associated with trauma suggests fragility fractures and warrants aggressive treatment at least initially.  Would recommend 1 year treatment w/ Tymlos (or other PTH analog) followed by IV BP treatment (GERD severe) and follow clinically/ dexa reports closely.  Concerned about degree of pain and need for opiates to control- as result has decreased function which would only increase risk for fractures. Needs to resume wt bearing exercise regimen now. Will  start with PT.\par Needs comprehensive metabolic w/u to include SPEP and PSA, testosterone given hx of Prostate Ca (done at Dr Lou's office - need to review and add anything if necessary).  \par \par 2) OA multisite:  \par - LBP s/p spinal fusion x 2 doing well \par - Thoracic:  diffuse DJD throughout - should consider interventional tx to minimze use of opiates, at least discuss with interventional PM.. for now continue opiates and start PT for strengthening.. and resumption of routine exercise regimen for back health \par NOTE: \par - 12/22 CTscan/ MR thoracic spine as noted.. seen by surgeon, not sx candidate at this time Dr Jarvis  \par \par 3) GERD: takes pantoprazole 20 mg daily.....gets indigestion when he doesn't take it for 2-3 days...last endoscopy was in Oct or Nov 2022....he had irritation in the esophageal area but no ulcerations\par \par 4) Prostate Ca:  need details \par \par \par Plan\par \par - f/u w/pain management for additional treatment for back pain\par \par - start PT now to reestablish  Ideally 30-45 mins moderate-intense wt bearing/ cardiovascular and strength training for optimal bone health    \par \par - Maintain 1417-9899 mg Ca / 2000 IU vit daily intake between diet and supplement (average diet with dairy = 800 mg calcium) with goal mnt Vit D between 30-50 for optimal bone health.  \par If unclear search "calcium content of food"  and calculate your average daily intake, then supplement as needed to achieve [phone] mg.  Higher doses can cause kidney stones and accelerate atherosclerosis (plaque) on your blood vessels.\par \par - contacting [employer] for assistance w /tymlos copay, call office if you haven't heard from anyone in 1-2 wks  \par \par - keep 3/15/23 appt to confirm treatment and follow response to PT..

## 2023-01-22 NOTE — HISTORY OF PRESENT ILLNESS
[FreeTextEntry1] : Initial HPI 1/19/22 \par 81 yo with hx of GERD and Prostate cancer \par Here for osteoporosis consultation following c/o NEW onset thoracic back pain \par \par He c/o pain that started about 6 months ago. Describes pain as being in the thoracic and cervical areas between his shoulders. He denies any fall or incident around the onset of his pain. His wife states he did have a fall in October 2022 where he pulled a hamstring, but he denies that it was the cause of his back  pain. \par - pain is 8/10....non-radiating pain that is worse in the morning when he first gets out of bed...takes tramadol every morning which is really effective....pain eventually starts to dissipates as the day progresses...denies any pain by the end of the day (only if he moves the wrong way)......denies that pain disrupts his sleep in anyway\par but is no longer able to exercise as he had been \par - Exercise: before pain he engaged in a multitude of body weight exercises (sit ups, leg lifts, etc....laying down hurts now)...now only rides bike when the weather permits (outdoors).. far more sedentary since onset of pain\par \par He presented to Dr. Puga who did a MRI, CT scan, and bone density of his cervical and thoracic spine. His imaging showed three stress fractures in his thoracic spine (T 7, 8, and 10) w/ marrow edema suggesting fairly recent- vertebral ht mnt.   Extensive DJD throughout w/ evidence of facet arthropathy throughout the region, but  no indication for sx intervention.  \par Does have extensive hx at lower back with 2 surgeries extending now from lower thoracic through entire lumbar region- Dr. Kamari Jarvis \par \par Baseline 11/17/22 Dexa w/ most severe T score at RFN -1.7, -1.5 at LFN and Wrist scores nl 0.5 at radius total, spine not done 2/2 spinal fusion.  Frax overall 8.3% risk, 3.1% risk hip fracture with 2 stress fractures w/ marrow edema at time of measuring.. ) in T spine at T7, T8 & T10 \par \par Seen by rheum/ Dr Lou  and told him that his bone loss looked pre-osteoporosis. He recommended and started sample of  Tymlos (has already taken 3 weeks of the sample he was given...has one more week left before he runs out). He is having difficulty affording Tymlos and does not qualify for assistance. Dr. Lou then suggested teriparatide, which he is currently trying to figure out the expenses for.\par \par - ROS:  overall stable wt, no fevers/ chills, lymphadenopathy, night sweats, cardiopulmonary sx. Mild diffuse OA but until recently well controlled \par + GERD: takes pantoprazole 20 mg daily.....gets indigestion when he doesn't take it for 2-3 days...last endoscopy was in Oct or Nov 2022....he had irritation in the esophageal area but no ulcerations. \par \par \par  \par \par - f/u via telehealth in 3 weeks

## 2023-01-22 NOTE — PHYSICAL EXAM
[General Appearance - Alert] : alert [General Appearance - In No Acute Distress] : in no acute distress [Sclera] : the sclera and conjunctiva were normal [PERRL With Normal Accommodation] : pupils were equal in size, round, and reactive to light [Extraocular Movements] : extraocular movements were intact [Neck Appearance] : the appearance of the neck was normal [Neck Cervical Mass (___cm)] : no neck mass was observed [Jugular Venous Distention Increased] : there was no jugular-venous distention [Thyroid Diffuse Enlargement] : the thyroid was not enlarged [Thyroid Nodule] : there were no palpable thyroid nodules [] : no respiratory distress [Respiration, Rhythm And Depth] : normal respiratory rhythm and effort [Auscultation Breath Sounds / Voice Sounds] : lungs were clear to auscultation bilaterally [Heart Rate And Rhythm] : heart rate was normal and rhythm regular [Heart Sounds] : normal S1 and S2 [Heart Sounds Gallop] : no gallops [Murmurs] : no murmurs [Heart Sounds Pericardial Friction Rub] : no pericardial rub [No CVA Tenderness] : no ~M costovertebral angle tenderness [No Spinal Tenderness] : no spinal tenderness [Abnormal Walk] : normal gait [Nail Clubbing] : no clubbing  or cyanosis of the fingernails [Musculoskeletal - Swelling] : no joint swelling seen [Motor Tone] : muscle strength and tone were normal [Oriented To Time, Place, And Person] : oriented to person, place, and time [Impaired Insight] : insight and judgment were intact [Affect] : the affect was normal [FreeTextEntry1] : OA changes in hands

## 2023-01-22 NOTE — REVIEW OF SYSTEMS
[As Noted in HPI] : as noted in HPI [Negative] : Gastrointestinal [FreeTextEntry7] : hx of GERD controlled on pantoprazole 20 mg

## 2023-01-25 NOTE — DATA REVIEWED
[de-identified] : PATIENT NAME: Harpal Carson\par PATIENT ID: [id number]\par : 1940\par DATE OF EXAM: 2022\par CT-THORACIC SPINE NON CONTRAST\par HISTORY: M54.6 Thoracic Pain\par CLINICAL HISTORY: 82-year-old male with back pain. History of a thoracic-lumbar\par fusion in 2014. Attention to T8.\par TECHNIQUE: A helical CT data set of the thoracic spine was obtained and was\par developed into axial, sagittal, and coronal reconstructions. Three-dimensional\par renderings were also developed. This study was performed using automatic\par exposure control and an iterative reconstruction technique (radiation dose\par reduction software) to obtain a diagnostic image quality scan with patient dose\par as low as reasonably achievable. The mA and kV were adjusted according to\par patient size. The administered radiation dose was 14.82 mSv.\par COMPARISON: A previous MR of the thoracic spine dated 11/3/2022 was available\par for review.\par FINDINGS: Signs of the previous surgery are demonstrated and include a posterior\par construct from T11-L5 possibly with an interbody spacer/cage at L4-5. The\par construct consists of bilateral pedicle screws at each level and bilateral\par posterior vertical connecting rods. Cross pieces at L1-2 and at L3-4 are also\par present. Note that the lumbar spine was not fully evaluated on this particular\par examination though initial localization  views provide an overview. The\par hardware from T11-L1 is intact. The more inferior portions of the hardware as\par well as I can tell also appears to be intact. No lucency around the screws from\par T11-L2 is demonstrated to suggest screw loosening. Full evaluation of the\par inferior portions of the hardware would require an exam focused to the lumbar\par spine. Bridging trabecular bone across the posterior elements from T11-L1 is\par present indicative of arthrodesis at these levels. Again, evaluation of the more\par inferior fused levels would require imaging focused to the lumbar spine.\par Laminotomies/laminectomies at T12 and L1 also appear to be present.\par A mild midthoracic scoliosis curved to the right is present. Correlating with\par the previous MR, compression deformities of the inferior endplates of T7 and T8\par are present with anterior wedging of both the T7 and T8 vertebral bodies with\par loss in height anteriorly at each level of approximately 20 percent. Sclerosis\par in the T8 vertebral body adjacent to the endplate fracture is present and\par correlates with the bone edema demonstrated on the prior MRI. No paraspinal soft\par tissue mass is associated with the fracture. No other fractures are\par demonstrated. No lytic or blastic disease is demonstrated.\par However, degenerative disease is present. The images included portions of the\par Page 2 of 2\par cervical spine and advanced multilevel degenerative cervical spondylosis is\par present and would require an examination focused to the cervical spine for full\par evaluation of the cervical spine. At C7-T1, a grade 1 degenerative\par spondylolisthesis of C7 anteriorly on T1 is present. The soft tissues of the\par spinal canal are seen to better advantage on the previous MR and include a\par moderate degree of spinal stenosis at C7-T1. The remainder of the thoracic\par spinal canal is clear. Degenerative disc disease is apparent, however, at\par multiple levels with disc space narrowing at every level in the thoracic spine\par and degenerative gas in the disc spaces at T7-8, T8-9, and T10-11. At T9-10, the\par disc space is greatly reduced in height and is almost completely absent with\par sclerosis of the endplates.\par IMPRESSION: Status post T11-L5 posterior spinal fusion. Mild compression\par fractures of the T7 and T8 vertebral bodies are present with appearances typical\par for osteopenic fractures without suspicious signs for an underlying neoplasm.\par ICD 10 -\par Signed by: Dr. Nixon Christianson\par Signed Date: 2022 12:41 PM EST\par SIGNED BY: Nixon Christianson M.D., EXT [id number] 2022 12:41 PM

## 2023-01-25 NOTE — HISTORY OF PRESENT ILLNESS
[de-identified] : This is an 81 y/o MALE known to Dr Jarvis d/t the need for surgical intervention in 2014 for spinal stenosis with myelopathy and pseudoarthrosis which was successful for symptom improvement evidenced by his ability to return to preop activity w/o difficulty.\par \par THEN  over the past 6 months he has been bothered by the  onset of upper back pain w/o any known provoking events\par Treated with Tramadol/Aleve. The pain is worst in the mornings\par \par During his workup he had an ortho. consultation\par DEXA + osteopenia and now on Tymlos per endocrine recommendations\par \par The pain is non radiating and responsive to Tramadol.\par Denies gait disturbance/any neurological deficits\par \par He brings the recently completed images for review.

## 2023-01-25 NOTE — PLAN
[FreeTextEntry1] : \par Dr Jarvis  reviewed imaging results with patient in terms he was able to comprehend with time allowed for questions NO surgical intervention was discussed with the patient  at this time \par Patient counselled regarding  worsening  or new symptoms and if these should occur, patient is aware  to contact the office for reevaluation,\par \par Discussed non-operative modalities to include supervised physical therapy  and oral pain medications as needed\par \par F/U PRN

## 2023-01-26 ENCOUNTER — NON-APPOINTMENT (OUTPATIENT)
Age: 83
End: 2023-01-26

## 2023-02-09 ENCOUNTER — NON-APPOINTMENT (OUTPATIENT)
Age: 83
End: 2023-02-09

## 2023-02-09 DIAGNOSIS — Z78.9 OTHER SPECIFIED HEALTH STATUS: ICD-10-CM

## 2023-02-09 DIAGNOSIS — Z87.19 PERSONAL HISTORY OF OTHER DISEASES OF THE DIGESTIVE SYSTEM: ICD-10-CM

## 2023-02-09 DIAGNOSIS — Z86.39 PERSONAL HISTORY OF OTHER ENDOCRINE, NUTRITIONAL AND METABOLIC DISEASE: ICD-10-CM

## 2023-02-09 RX ORDER — OMEPRAZOLE 40 MG/1
40 CAPSULE, DELAYED RELEASE ORAL TWICE DAILY
Refills: 0 | Status: ACTIVE | COMMUNITY

## 2023-02-13 RX ORDER — ABALOPARATIDE 2000 UG/ML
3120 INJECTION, SOLUTION SUBCUTANEOUS
Qty: 3 | Refills: 10 | Status: ACTIVE | COMMUNITY
Start: 1900-01-01 | End: 1900-01-01

## 2023-02-14 ENCOUNTER — APPOINTMENT (OUTPATIENT)
Dept: PAIN MANAGEMENT | Facility: CLINIC | Age: 83
End: 2023-02-14
Payer: MEDICARE

## 2023-02-14 VITALS — BODY MASS INDEX: 25.92 KG/M2 | HEIGHT: 69 IN | WEIGHT: 175 LBS

## 2023-02-14 PROCEDURE — 99213 OFFICE O/P EST LOW 20 MIN: CPT

## 2023-02-14 NOTE — HISTORY OF PRESENT ILLNESS
[Upper back] : upper back [2] : 2 [Throbbing] : throbbing [Constant] : constant [Household chores] : household chores [Sleep] : sleep [Retired] : Work status: retired [] : Post Surgical Visit: no [de-identified] : DAILY ACTIVITY

## 2023-03-15 ENCOUNTER — APPOINTMENT (OUTPATIENT)
Dept: RHEUMATOLOGY | Facility: CLINIC | Age: 83
End: 2023-03-15

## 2023-03-19 RX ORDER — PEN NEEDLE, DIABETIC 31 GX3/16"
31G X 5 MM NEEDLE, DISPOSABLE MISCELLANEOUS
Qty: 3 | Refills: 3 | Status: ACTIVE | COMMUNITY
Start: 2023-01-22 | End: 1900-01-01

## 2023-03-20 ENCOUNTER — NON-APPOINTMENT (OUTPATIENT)
Age: 83
End: 2023-03-20

## 2023-03-28 ENCOUNTER — APPOINTMENT (OUTPATIENT)
Dept: RHEUMATOLOGY | Facility: CLINIC | Age: 83
End: 2023-03-28
Payer: MEDICARE

## 2023-03-28 VITALS
HEART RATE: 78 BPM | WEIGHT: 175 LBS | BODY MASS INDEX: 25.84 KG/M2 | TEMPERATURE: 97.8 F | OXYGEN SATURATION: 98 % | DIASTOLIC BLOOD PRESSURE: 74 MMHG | SYSTOLIC BLOOD PRESSURE: 128 MMHG

## 2023-03-28 DIAGNOSIS — M80.80XA OTHER OSTEOPOROSIS WITH CURRENT PATHOLOGICAL FRACTURE, UNSPECIFIED SITE, INITIAL ENCOUNTER FOR FRACTURE: ICD-10-CM

## 2023-03-28 DIAGNOSIS — C61 MALIGNANT NEOPLASM OF PROSTATE: ICD-10-CM

## 2023-03-28 DIAGNOSIS — N31.9 NEUROMUSCULAR DYSFUNCTION OF BLADDER, UNSPECIFIED: ICD-10-CM

## 2023-03-28 PROCEDURE — 99213 OFFICE O/P EST LOW 20 MIN: CPT | Mod: 25

## 2023-03-28 PROCEDURE — 36415 COLL VENOUS BLD VENIPUNCTURE: CPT

## 2023-03-28 RX ORDER — GABAPENTIN 300 MG/1
300 CAPSULE ORAL 4 TIMES DAILY
Refills: 0 | Status: DISCONTINUED | COMMUNITY
End: 2023-03-28

## 2023-03-28 RX ORDER — ZOLPIDEM TARTRATE 5 MG/1
5 TABLET, FILM COATED ORAL
Refills: 0 | Status: DISCONTINUED | COMMUNITY
End: 2023-03-28

## 2023-03-28 RX ORDER — MIRABEGRON 25 MG/1
25 TABLET, FILM COATED, EXTENDED RELEASE ORAL DAILY
Refills: 0 | Status: DISCONTINUED | COMMUNITY
End: 2023-03-28

## 2023-03-28 RX ORDER — ASPIRIN 325 MG/1
325 TABLET ORAL
Refills: 0 | Status: DISCONTINUED | COMMUNITY
End: 2023-03-28

## 2023-03-28 RX ORDER — VIBEGRON 75 MG/1
75 TABLET, FILM COATED ORAL
Qty: 30 | Refills: 0 | Status: ACTIVE | COMMUNITY
Start: 2023-03-28

## 2023-03-28 NOTE — PHYSICAL EXAM
[General Appearance - Alert] : alert [General Appearance - In No Acute Distress] : in no acute distress [Sclera] : the sclera and conjunctiva were normal [PERRL With Normal Accommodation] : pupils were equal in size, round, and reactive to light [Extraocular Movements] : extraocular movements were intact [Neck Appearance] : the appearance of the neck was normal [Neck Cervical Mass (___cm)] : no neck mass was observed [Jugular Venous Distention Increased] : there was no jugular-venous distention [Thyroid Diffuse Enlargement] : the thyroid was not enlarged [Thyroid Nodule] : there were no palpable thyroid nodules [] : no respiratory distress [Respiration, Rhythm And Depth] : normal respiratory rhythm and effort [Auscultation Breath Sounds / Voice Sounds] : lungs were clear to auscultation bilaterally [Heart Rate And Rhythm] : heart rate was normal and rhythm regular [Heart Sounds] : normal S1 and S2 [Heart Sounds Gallop] : no gallops [Murmurs] : no murmurs [Heart Sounds Pericardial Friction Rub] : no pericardial rub [No CVA Tenderness] : no ~M costovertebral angle tenderness [No Spinal Tenderness] : no spinal tenderness [Abnormal Walk] : normal gait [Nail Clubbing] : no clubbing  or cyanosis of the fingernails [Musculoskeletal - Swelling] : no joint swelling seen [Motor Tone] : muscle strength and tone were normal [Oriented To Time, Place, And Person] : oriented to person, place, and time [Impaired Insight] : insight and judgment were intact [Affect] : the affect was normal [General Appearance - Well Developed] : well developed [General Appearance - Well Nourished] : well nourished [General Appearance - Well-Appearing] : healthy appearing [FreeTextEntry1] : OA changes in hands

## 2023-03-28 NOTE — CONSULT LETTER
[Dear  ___] : Dear  [unfilled], [Consult Letter:] : I had the pleasure of evaluating your patient, [unfilled]. [Please see my note below.] : Please see my note below. [Consult Closing:] : Thank you very much for allowing me to participate in the care of this patient.  If you have any questions, please do not hesitate to contact me. [Sincerely,] : Sincerely, [FreeTextEntry2] : See Amaral MD  [FreeTextEntry3] : Rossi Teixeira DNP, ANP-C\par Division or Rheumatology\par [facility]\par \par  [FreeTextEntry1] : Please see below for summary of  recent rheumatology evaluation and recommendations.\par \par 83 yo with hx of LBP s/p fusion x 2 w/ diffuse OA, GERD and Prostate cancer (controlled x many ys)\par  c/o NEW onset thoracic back pain and recent dx compression fractures w/ dx of osteoporosis.   \par \par 1) Clinical osteoporosis w/ compression fractures x 3:  presented w/ sudden thoracic back pain. \par Baseline 11/17/22 Dexa w/ most severe T score at RFN -1.7, -1.5 at LFN and Wrist scores nl 0.5 at radius total, spine not done 2/2 spinal fusion.  \par Frax overall 8.3% risk, 3.1% risk hip fracture with 2 stress fractures w/ marrow edema at time of measuring) in T spine at T7, T8 & T10\par Risk factors:  Prostate Ca (need hx of treatment), GERD, advanced age\par No previous dx or treatment till recent.  Started Tymlos 12/22 tolerated fine.. struggling with financial coverage\par Denies FH, or other risk factors... \par No renal calculi or hx of radiation tx  \par \par Discussion:  treatment decision, previously active healthy 83 yo with 3 compression fractures, not associated with trauma suggests fragility fractures and warrants aggressive treatment at least initially.  Would recommend 1 year treatment w/ Tymlos (or other PTH analog) followed by IV BP treatment (GERD severe) and follow clinically/ dexa reports closely.  Concerned about degree of pain and need for opiates to control- as result has decreased function which would only increase risk for fractures. Needs to resume wt bearing exercise regimen now. Will  start with PT.\par Needs comprehensive metabolic w/u to include SPEP and PSA, testosterone given hx of Prostate Ca (done at Dr Lou's office - need to review and add anything if necessary).  \par \par 2) OA multisite:  \par - LBP s/p spinal fusion x 2 doing well \par - Thoracic:  diffuse DJD throughout - should consider interventional tx to minimze use of opiates, at least discuss with interventional PM.. for now continue opiates and start PT for strengthening.. and resumption of routine exercise regimen for back health \par NOTE: \par - 12/22 CTscan/ MR thoracic spine as noted.. seen by surgeon, not sx candidate at this time Dr Jarvis  \par \par 3) GERD: takes pantoprazole 20 mg daily.....gets indigestion when he doesn't take it for 2-3 days...last endoscopy was in Oct or Nov 2022....he had irritation in the esophageal area but no ulcerations\par \par 4) Prostate Ca:  need details \par \par \par Plan\par \par - f/u w/pain management for additional treatment for back pain\par \par - start PT now to reestablish  Ideally 30-45 mins moderate-intense wt bearing/ cardiovascular and strength training for optimal bone health    \par \par - Maintain 6943-5381 mg Ca / 2000 IU vit daily intake between diet and supplement (average diet with dairy = 800 mg calcium) with goal mnt Vit D between 30-50 for optimal bone health.  \par If unclear search "calcium content of food"  and calculate your average daily intake, then supplement as needed to achieve 1722-1969 mg.  Higher doses can cause kidney stones and accelerate atherosclerosis (plaque) on your blood vessels.\par \par - contacting [employer] for assistance w /tymlos copay, call office if you haven't heard from anyone in 1-2 wks  \par \par - keep 3/15/23 appt to confirm treatment and follow response to PT..

## 2023-03-28 NOTE — HISTORY OF PRESENT ILLNESS
[FreeTextEntry1] : 3/28/2023\par \par - started Tymlos w/ no issues and tolerating it well......now on second pen\par \par - c/o dry eyes and is now on erythromycin eye ointment that he uses nightly...uses during the day PRN\par \par - about 2-3 weeks ago he was experiencing an electrical shock in his L arm for 2 days.....he restarted gabapentin for 2 days and the sensation went away\par - no longer on gabapentin \par - up to date on shingle vaccines\par  \par - takes tramadol daily for his back discomfort and finds it effective \par - prostate cancer is actively being surveilled \par - taking for urinary urgency and  incontinence and finds it slightly effective \par \par - repeat DEXA 11/18/2023\par \par ______________________________________________________________________________\par \par Initial HPI 1/19/22 \par 83 yo with hx of GERD and Prostate cancer \par Here for osteoporosis consultation following c/o NEW onset thoracic back pain \par \par He c/o pain that started about 6 months ago. Describes pain as being in the thoracic and cervical areas between his shoulders. He denies any fall or incident around the onset of his pain. His wife states he did have a fall in October 2022 where he pulled a hamstring, but he denies that it was the cause of his back  pain. \par - pain is 8/10....non-radiating pain that is worse in the morning when he first gets out of bed...takes tramadol every morning which is really effective....pain eventually starts to dissipates as the day progresses...denies any pain by the end of the day (only if he moves the wrong way)......denies that pain disrupts his sleep in anyway\par but is no longer able to exercise as he had been \par - Exercise: before pain he engaged in a multitude of body weight exercises (sit ups, leg lifts, etc....laying down hurts now)...now only rides bike when the weather permits (outdoors).. far more sedentary since onset of pain\par \par He presented to Dr. Puga who did a MRI, CT scan, and bone density of his cervical and thoracic spine. His imaging showed three stress fractures in his thoracic spine (T 7, 8, and 10) w/ marrow edema suggesting fairly recent- vertebral ht mnt.   Extensive DJD throughout w/ evidence of facet arthropathy throughout the region, but  no indication for sx intervention.  \par Does have extensive hx at lower back with 2 surgeries extending now from lower thoracic through entire lumbar region- Dr. Kamari Jarvis \par \par Baseline 11/17/22 Dexa w/ most severe T score at RFN -1.7, -1.5 at LFN and Wrist scores nl 0.5 at radius total, spine not done 2/2 spinal fusion.  Frax overall 8.3% risk, 3.1% risk hip fracture with 2 stress fractures w/ marrow edema at time of measuring.. ) in T spine at T7, T8 & T10 \par \par Seen by rheum/ Dr Lou  and told him that his bone loss looked pre-osteoporosis. He recommended and started sample of  Tymlos (has already taken 3 weeks of the sample he was given...has one more week left before he runs out). He is having difficulty affording Tymlos and does not qualify for assistance. Dr. Lou then suggested teriparatide, which he is currently trying to figure out the expenses for.\par \par - ROS:  overall stable wt, no fevers/ chills, lymphadenopathy, night sweats, cardiopulmonary sx. Mild diffuse OA but until recently well controlled \par + GERD: takes pantoprazole 20 mg daily.....gets indigestion when he doesn't take it for 2-3 days...last endoscopy was in Oct or Nov 2022....he had irritation in the esophageal area but no ulcerations. \par \par \par  \par \par - f/u via telehealth in 3 weeks

## 2023-03-28 NOTE — REVIEW OF SYSTEMS
[As Noted in HPI] : as noted in HPI [Dry Eyes] : dryness of the eyes [Negative] : Heme/Lymph [FreeTextEntry7] : hx of GERD controlled on pantoprazole 20 mg

## 2023-03-28 NOTE — ASSESSMENT
[FreeTextEntry1] : 84 yo with hx of LBP s/p fusion x 2 w/ diffuse OA, GERD and Prostate cancer (controlled x many ys)\par  c/o NEW onset thoracic back pain and recent dx compression fractures w/ dx of osteoporosis.   \par \par 1) Clinical osteoporosis w/ compression fractures x 3:  presented w/ sudden thoracic back pain. \par Baseline 11/17/22 Dexa w/ most severe T score at RFN -1.7, -1.5 at LFN and Wrist scores nl 0.5 at radius total, spine not done 2/2 spinal fusion.  \par Frax overall 8.3% risk, 3.1% risk hip fracture with 2 stress fractures w/ marrow edema at time of measuring) in T spine at T7, T8 & T10\par Risk factors:  Prostate Ca (need hx of treatment), GERD, advanced age\par No previous dx or treatment till recent.  Started Tymlos 12/22 tolerated fine- but not continued\par Restarted 2/23 and doing well with no issues.  \par Metabolic profile pending:  testosterone, vit D, ca/ PTH \par Denies FH, or other risk factors... \par No renal calculi or hx of radiation tx  \par \par Discussion:  treatment decision, previously active healthy 83 yo with 3 compression fractures, not associated with trauma suggests fragility fractures and warrants aggressive treatment at least initially.  Would recommend 1 year treatment w/ Tymlos (or other PTH analog) followed by IV BP treatment (GERD severe) and follow clinically/ dexa reports closely.  Concerned about degree of pain and need for opiates to control- as result has decreased function which would only increase risk for fractures, doing much better now working with PM and taking low dose opiates tramadol 37.5 mg once-twice daily.  Has resumed wt bearing exercise regimen now. Will  start with PT.\par Needs comprehensive metabolic w/u to include SPEP and PSA, testosterone given hx of Prostate Ca (done at Dr Lou's office - need to review and add anything if necessary- never received records. Ordered labs now.. \par \par 2) OA multisite:  \par - LBP s/p spinal fusion x 2 doing well \par - Thoracic:  diffuse DJD throughout - should consider interventional tx to minimze use of opiates, at least discuss with interventional PM.. for now continue opiates and start PT for strengthening.. and resumption of routine exercise regimen for back health \par NOTE: \par - 12/22 CTscan/ MR thoracic spine as noted.. seen by surgeon, not sx candidate at this time Dr Jarvis  \par \par 3) GERD: takes pantoprazole 20 mg daily.....gets indigestion when he doesn't take it for 2-3 days...last endoscopy was in Oct or Nov 2022....he had irritation in the esophageal area but no ulcerations\par \par 4) Prostate Ca:  need details \par \par \par Plan\par \par - f/u w/pain management for additional treatment for back pain\par \par - continue t PT now to reestablish  Ideally 30-45 mins moderate-intense wt bearing/ cardiovascular and strength training for optimal bone health    \par \par - Maintain 0335-9753 mg Ca / 2000 IU vit daily intake between diet and supplement (average diet with dairy = 800 mg calcium) with goal mnt Vit D between 30-50 for optimal bone health.  \par If unclear search "calcium content of food"  and calculate your average daily intake, then supplement as needed to achieve 7545-6616 mg.  Higher doses can cause kidney stones and accelerate atherosclerosis (plaque) on your blood vessels.\par \par - Continue /tymlos copay\par - RPA 6 m

## 2023-04-03 LAB
25(OH)D3 SERPL-MCNC: 41.2 NG/ML
ALBUMIN MFR SERPL ELPH: 63.6 %
ALBUMIN SERPL ELPH-MCNC: 4.6 G/DL
ALBUMIN SERPL-MCNC: 4.3 G/DL
ALBUMIN/GLOB SERPL: 1.7 RATIO
ALP BLD-CCNC: 74 U/L
ALPHA1 GLOB MFR SERPL ELPH: 3.5 %
ALPHA1 GLOB SERPL ELPH-MCNC: 0.2 G/DL
ALPHA2 GLOB MFR SERPL ELPH: 9.1 %
ALPHA2 GLOB SERPL ELPH-MCNC: 0.6 G/DL
ALT SERPL-CCNC: 17 U/L
ANION GAP SERPL CALC-SCNC: 15 MMOL/L
AST SERPL-CCNC: 20 U/L
B-GLOBULIN MFR SERPL ELPH: 10.7 %
B-GLOBULIN SERPL ELPH-MCNC: 0.7 G/DL
BASOPHILS # BLD AUTO: 0.06 K/UL
BASOPHILS NFR BLD AUTO: 0.9 %
BILIRUB SERPL-MCNC: 0.7 MG/DL
BUN SERPL-MCNC: 22 MG/DL
CALCIUM SERPL-MCNC: 9.8 MG/DL
CALCIUM SERPL-MCNC: 9.8 MG/DL
CHLORIDE SERPL-SCNC: 106 MMOL/L
CO2 SERPL-SCNC: 21 MMOL/L
CREAT SERPL-MCNC: 1.12 MG/DL
EGFR: 66 ML/MIN/1.73M2
EOSINOPHIL # BLD AUTO: 0.17 K/UL
EOSINOPHIL NFR BLD AUTO: 2.5 %
GAMMA GLOB FLD ELPH-MCNC: 0.9 G/DL
GAMMA GLOB MFR SERPL ELPH: 13.1 %
GLUCOSE SERPL-MCNC: 91 MG/DL
HCT VFR BLD CALC: 46.4 %
HGB BLD-MCNC: 15 G/DL
IMM GRANULOCYTES NFR BLD AUTO: 0.3 %
INTERPRETATION SERPL IEP-IMP: NORMAL
LYMPHOCYTES # BLD AUTO: 1.35 K/UL
LYMPHOCYTES NFR BLD AUTO: 19.9 %
MAN DIFF?: NORMAL
MCHC RBC-ENTMCNC: 29.1 PG
MCHC RBC-ENTMCNC: 32.3 GM/DL
MCV RBC AUTO: 89.9 FL
MONOCYTES # BLD AUTO: 0.73 K/UL
MONOCYTES NFR BLD AUTO: 10.7 %
NEUTROPHILS # BLD AUTO: 4.47 K/UL
NEUTROPHILS NFR BLD AUTO: 65.7 %
PARATHYROID HORMONE INTACT: 29 PG/ML
PLATELET # BLD AUTO: 249 K/UL
POTASSIUM SERPL-SCNC: 4.6 MMOL/L
PROT SERPL-MCNC: 6.8 G/DL
RBC # BLD: 5.16 M/UL
RBC # FLD: 16 %
SODIUM SERPL-SCNC: 142 MMOL/L
TESTOST SERPL-MCNC: 325 NG/DL
TSH SERPL-ACNC: 3.7 UIU/ML
WBC # FLD AUTO: 6.8 K/UL

## 2023-05-02 ENCOUNTER — APPOINTMENT (OUTPATIENT)
Dept: PAIN MANAGEMENT | Facility: CLINIC | Age: 83
End: 2023-05-02
Payer: MEDICARE

## 2023-05-02 DIAGNOSIS — M54.16 RADICULOPATHY, LUMBAR REGION: ICD-10-CM

## 2023-05-02 PROCEDURE — 99213 OFFICE O/P EST LOW 20 MIN: CPT

## 2023-05-02 RX ORDER — TRAMADOL HYDROCHLORIDE AND ACETAMINOPHEN 37.5; 325 MG/1; MG/1
37.5-325 TABLET, FILM COATED ORAL
Qty: 60 | Refills: 0 | Status: ACTIVE | COMMUNITY
Start: 2023-01-18 | End: 1900-01-01

## 2023-05-02 NOTE — REASON FOR VISIT
[FreeTextEntry2] : PT IS BEING SEEN FOR A FOLLOW-UP IN OFFICE  PAIN MANAGEMENT VISIT FOR MED REFILL

## 2023-05-02 NOTE — HISTORY OF PRESENT ILLNESS
[Upper back] : upper back [2] : 2 [Throbbing] : throbbing [Constant] : constant [Household chores] : household chores [Sleep] : sleep [Retired] : Work status: retired [de-identified] : 05/02/2023- PATIENT STATES ---- PHYSICAL THERAPY IN-------  3X A WEEK  AND REPORT'S WORSEN  IMPROVEMENT WITH PAIN.\par PT STATES -------- HOME STRETCHING PROGRAM AT HOME 4X A WEEK  AND REPORT'S MILD IMPROVEMENT WITH PAIN. [] : Post Surgical Visit: no [de-identified] : DAILY ACTIVITY

## 2023-10-16 NOTE — REVIEW OF SYSTEMS
Jamie Cee is a 59 year old male presenting for   Chief Complaint   Patient presents with   • Office Visit   • Follow-up     2 week follow up- BP     Denies Latex allergy or sensitivity.    Medication verified, no changes  Refills needed today: No    Health Maintenance Due   Topic Date Due   • COVID-19 Vaccine (1) Never done   • Pneumococcal Vaccine 0-64 (1 - PCV) Never done   • Influenza Vaccine (1) 09/01/2023       Patient is due for topics as listed above but is not proceeding with Immunization(s) COVID-19, Influenza and Pneumococcal at this time.           Last lab results:   Hemoglobin A1C (%)   Date Value   11/06/2017 5.9 (H)     Cholesterol (mg/dL)   Date Value   01/24/2022 231 (H)     HDL (mg/dL)   Date Value   01/24/2022 70     Triglycerides (mg/dL)   Date Value   01/24/2022 89     LDL (mg/dL)   Date Value   01/24/2022 143 (H)     MICROALBUMIN, UA (TTL) (mg/dL)   Date Value   11/09/2015 0.76     CREATININE, URINE (TOTAL) (mg/dL)   Date Value   11/09/2015 83.2     MICROALBUMIN/CREATININE (mcg/mg)   Date Value   11/09/2015 9.1     No results found for: \"IFOB\"               Depression Screening:  Review Flowsheet  More data exists       10/2/2023   PHQ 2/9 Score   Adult PHQ 2 Score 0   Adult PHQ 2 Interpretation No further screening needed   Little interest or pleasure in activity? Not at all   Feeling down, depressed or hopeless? Not at all        Advance Directives:  discussed and advised the patient to complete one    [As Noted in HPI] : as noted in HPI [Negative] : Heme/Lymph

## 2023-11-09 NOTE — ASU PATIENT PROFILE, ADULT - NSALCOHOLPROBLEMSRELYN_GEN_A_CORE_SD
Patient was seen today through synchronous audio/video technology. Verbal consent was obtained. The patient was located at home. Vitals signs were obtained by patient and recorded.     I was located at our [facility] office for this telehealth visit.    Chief Complaint  covid positive    History of Present Illness  Sharda Bowen is a 68 y.o. female presenting via video-conference today for positive home COVID testing with desire for treatment Paxlovid.    She started on Tuesday with fever, aches, and chills and has had some mild cough but no pleuritic pain or hemoptysis.    She did not get her most recent COVID booster but did have a COVID booster in July 2023.  Her kidney function has been normal except for her most recent labs when she was a little dehydrated at the time of blood work but her GFR has been over 60 with all other blood work without concerns for chronic kidney disease.    We did review her medication list and discussed need to hold her pravastatin dose while on Paxlovid given the potential for statins interact with Paxlovid treatment.      The following portions of the patient's history were reviewed and updated as appropriate: allergies, current medications, past family history, past medical history, past social history, past surgical history and problem list.    Review of Systems   Constitutional:  Positive for chills, fatigue and fever. Negative for appetite change and unexpected weight change.   HENT:  Positive for congestion. Negative for hearing loss.    Eyes:  Negative for visual disturbance.   Respiratory:  Negative for chest tightness, shortness of breath and wheezing.    Cardiovascular:  Negative for chest pain, palpitations and leg swelling.   Gastrointestinal:  Negative for abdominal pain.   Musculoskeletal:  Negative for arthralgias, back pain and gait problem.   Skin:  Negative for rash.   Neurological:  Negative for dizziness and headaches.    Psychiatric/Behavioral:  Negative for agitation and confusion. The patient is not nervous/anxious.        Objective  There were no vitals taken for this visit.    Physical Exam  Constitutional:       General: She is not in acute distress.     Appearance: Normal appearance. She is not ill-appearing.   HENT:      Head: Normocephalic and atraumatic.      Right Ear: External ear normal.      Left Ear: External ear normal.      Nose: Nose normal.   Eyes:      Extraocular Movements: Extraocular movements intact.      Conjunctiva/sclera: Conjunctivae normal.      Pupils: Pupils are equal, round, and reactive to light.   Pulmonary:      Effort: Pulmonary effort is normal. No respiratory distress.   Musculoskeletal:         General: Normal range of motion.      Cervical back: Normal range of motion.   Skin:     Findings: No rash.   Neurological:      General: No focal deficit present.      Mental Status: She is alert and oriented to person, place, and time.   Psychiatric:         Mood and Affect: Mood normal.         Behavior: Behavior normal.         Thought Content: Thought content normal.           Assessment/Plan   Diagnoses and all orders for this visit:    1. COVID-19 virus infection (Primary)  Assessment & Plan:  Discussed together treatment options for COVID-19 infection and her symptoms have been present less than 5 days.    She is interested in treatment with Paxlovid.  We did discuss use, side effects, and expectations with Paxlovid.  Renal function reviewed and her GFR has been over 60 and she was a little dehydrated with last labs we will treat with full dosing of Paxlovid.    Discussed need to hold her statin while on Paxlovid and she may resume statin after completion of Paxlovid treatment.    Contact precautions and quarantine discussed and recommended.  We did also discuss the timing of her next COVID vaccination and I did recommend she wait 8 to 12 weeks before getting her COVID booster after recent COVID  infection.        Orders:  -     Nirmatrelvir&Ritonavir 300/100 (PAXLOVID) 20 x 150 MG & 10 x 100MG tablet therapy pack tablet; Take 3 tablets by mouth 2 (Two) Times a Day for 5 days. Indications: COVID-19 Confirmed Infection, home post test today.  GFR has been normal.  Holding pravastatin on paxlovid  Dispense: 30 tablet; Refill: 0                Return if symptoms worsen or fail to improve.    Eduardo Angelo MD   no

## 2023-12-05 NOTE — ASU PREOP CHECKLIST - NS PREOP CHK HIBICLENS NA
[facility]  Section of General Neurology  New Patient Visit      Emma Rudolph MRN# [id number]   Age: 75 year old YOB: 1948     Requesting physician: Flynn Jaffe     Reason for Consultation: Memory loss           Assessment and Plan:   Assessment:  Pat is a 75-year-old female with past medical history significant for hyperlipidemia, hypertension, generalized anxiety disorder, depression and a left breast mass status post resection presenting to the neurology clinic with short-term memory loss.    MoCA today was 24/30 with 5 points being lost in the delayed recall section alone (patient could not remember any of the 5 words, even with prompts).  This is consistent with a diagnosis of mild cognitive impairment with amnestic predominance.  Recommend at this time obtaining further workup including neuropsychological testing, brain MRI without contrast, serum amyloid 42/40 ratio, vitamin B1, B12 and a TSH level.  Will plan to follow-up with this patient after neuropsychological testing is complete to review all data.  Overall impression at this time is that this patient may be developing early signs of Alzheimer's dementia, as disproportionate decline in short-term memory is often the initial symptom, but will review further data as it is obtained to assist with diagnosis.     Plan:  -Lab work including vitamin B1, B12, TSH  -Serum amyloid 42/40 ratio  -MRI brain without contrast  -Neuropsychological testing  -Follow-up in 5 to 6 months after all data has been acquired          GIL Lagos D.O.  Resident Physician of Neurology  [facility]    Patient discussed with my supervising physician Dr. Gilbert, who agrees with the critical findings, assessment, and plan as documented in the note above or as otherwise in their attestation.        History of Presenting Symptoms:   Emma Rudolph is a 75 year old female who  presents today for evaluation of memory loss.  Patient has driven herself to the appointment today and is present alone.  Patient lives alone and is able to function without issue.  She pays her own bills, uses her own computer, and takes care of herself without assistance.  Per report, all ADLs and IADLs are intact.  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      Upon entering the room to visit with the patient, patient does not know why she is at the appointment.  She is also unaware of the date and thinks it is Wednesday rather than Tuesday.  Of concern, she does not remember the date later on in the visit after it had earlier been told to her.  She denies any new strange behaviors or increased level of impulsivity recently.  She has not noticed any odd tremors or abnormal movements recently.  She denies hallucinations.  She does endorse having had a fall recently, but appears to have been an accident that occurred  tripping down some stairs outside.  Otherwise her gait is normal.  She denies any other neurological symptoms including numbness, tingling, weakness, vision changes, or dizziness at this time.          Past Medical History:     Patient Active Problem List   Diagnosis    Generalized anxiety disorder    Mixed hyperlipidemia    CKD (chronic kidney disease) stage 2, GFR 60-89 ml/min    Major depression in complete remission (H) on meds since 6-12-13    Osteoarthritis of multiple joints, unspecified osteoarthritis type of #2&3 fingers bilat R>L    Family history of ischemic heart disease    Memory loss    Primary insomnia    Benign essential hypertension    Breast mass, left     Past Medical History:   Diagnosis Date    Breast lesion     LEFT    Generalized anxiety disorder     Hyperlipidemia LDL goal <130     Hypertension     Mild major depression (H24)     Osteoarthritis of multiple joints, unspecified osteoarthritis type of #2&3 fingers bilat R>L      Vitamin D deficiency disease         Past Surgical History:     Past Surgical History:   Procedure Laterality Date    BIOPSY BREAST SEED LOCALIZATION Left 10/28/2020    Procedure: 2 SEED LOCALIZED LEFT BREAST BIOPSY;  Surgeon: Eileen Montes MD;  Location:  OR    BREAST SURGERY  2020    COLONOSCOPY      EYE SURGERY Left 11/2020    vitrictomy     GYN SURGERY      H ARGON LASER FOR RETINAL TEAR Left 3/2014    HEAD & NECK SURGERY      wisdom teeth    LAPAROSCOPIC SINGLE SITE SALPINGO-OOPHORECTOMY Bilateral 4/15/2015    Procedure: LAPAROSCOPIC SINGLE SITE SALPINGO-OOPHORECTOMY;  Surgeon: Leonora Zavaleta MD;  Location:  [facility]    wisdom teeth removal  age 21        Social History:     Social History     Tobacco Use    Smoking status: Never    Smokeless tobacco: Never   Vaping Use    Vaping Use: Never used   Substance Use Topics    Alcohol use: Yes     Comment: infrequent    Drug use: No        Family History:     Family History   Problem Relation Age of Onset    Breast Cancer Mother 80    Neurologic Disorder Father         Parkinson's    Coronary Artery Disease Father 44        MI    Hyperlipidemia Father     Anxiety Disorder Father     Lipids Brother     Hypertension Brother     Hyperlipidemia Brother     Lipids Brother     Family History Negative Brother     Lipids Sister     Hypertension Sister     Hyperlipidemia Sister     Family History Negative Sister     Family History Negative Sister     Family History Negative Sister     Cerebrovascular Disease Maternal Grandmother 86    Cancer Maternal Grandfather 69        leukemia    Other Cancer Maternal Grandfather         Leukemia    Cancer Paternal Grandfather 69        stomach    Other Cancer Paternal Grandfather         stomach cancer    Diabetes No family hx of     Cancer - colorectal No family hx of     Hypertension No family hx of     Hyperlipidemia No family hx of     Colon Cancer No family hx of     Prostate Cancer No family hx of     Other Cancer No family hx  of     Depression No family hx of     Anxiety Disorder No family hx of     Mental Illness No family hx of     Substance Abuse No family hx of     Anesthesia Reaction No family hx of     Asthma No family hx of     Osteoporosis No family hx of     Genetic Disorder No family hx of     Thyroid Disease No family hx of     Obesity No family hx of     Unknown/Adopted No family hx of         Medications:     Current Outpatient Medications   Medication Sig    amLODIPine (NORVASC) 2.5 MG tablet Take 1 tablet (2.5 mg) by mouth daily FOLLOW UP APPOINTMENT NEEDED FOR FUTURE REFILLS    atorvastatin (LIPITOR) 20 MG tablet Take 1 tablet (20 mg) by mouth daily    Cholecalciferol (VITAMIN D) 1000 UNITS capsule Take 1 capsule by mouth daily.    desvenlafaxine (PRISTIQ) 100 MG 24 hr tablet Take 1 tablet (100 mg) by mouth daily    lisinopril (ZESTRIL) 40 MG tablet Take 1 tablet (40 mg) by mouth daily    Multiple Vitamins-Minerals (MULTIVITAMIN OR) Take by mouth daily    raloxifene (EVISTA) 60 MG tablet Take 1 tablet (60 mg) by mouth daily     Current Facility-Administered Medications   Medication    2 mL bupivacaine (MARCAINE) preservative free injection 0.25% (10 mL vial)    2 mL bupivacaine (MARCAINE) preservative free injection 0.25% (10 mL vial)    lidocaine 1 % injection 2 mL    lidocaine 1 % injection 2 mL    triamcinolone (KENALOG-40) injection 40 mg    triamcinolone (KENALOG-40) injection 40 mg        Allergies:     Allergies   Allergen Reactions    Penicillins Swelling, Rash and Hives     Amoxicillin    Sulfa Antibiotics Hives        Review of Systems:   As noted above     Physical Exam:   Vitals: BP (!) 144/83   Pulse 115   LMP  (LMP Unknown)   SpO2 97%   CV: peripheral pulse appreciated  Lungs: breathing comfortably  Extremities: no edema  Skin: No rashes    Neuro:   General Appearance: No apparent distress, well-nourished, well-groomed, pleasant     Mental Status: Alert and oriented to person, place, and time. Speech  fluent and comprehension intact. No dysarthria.  Poor short-term memory, long-term memory intact.  Normal fund of knowledge, attention/concentration, and language    Cranial Nerves:   II: Visual fields: normal  III: Pupils: 3 mm, equal, round, reactive to light   III,IV,VI: Extraocular Movements: intact   V: Facial sensation: intact to light touch  VII: Facial strength: intact without asymmetry  VIII: Hearing: intact grossly  IX: Palate: intact   XI: Shoulder shrug: intact  XII: Tongue movement: normal     Motor Exam:   5/5 Diffusely    No drift is present. No abnormal movements. Tone is normal throughout.    Sensory: intact to light touch throughout    Coordination: no dysmetria with finger-to-nose bilaterally    Reflexes: biceps, triceps, brachioradialis, patellar, and ankle jerks 2+ and symmetric. Toes are downgoing bilaterally    Gait: normal casual gait, normal stride length    MoCA score  Visuospatial/Executive  5/5  Naming 3/3  Memory/Delayed Recall  0/5 -could not remember any of the 5 words, even with cues  Attention  6/6  Language Repetition 2/2  Language Fluency  1/1  Abstraction 2/2  Orientation  4/6 -does not know the date or day of the week  Total 23/30           Data: Pertinent prior to visit   Imaging:  Reviewed -no brain MRI on file    Procedures:  Reviewed    Laboratory:  Reviewed          N/A

## 2024-01-01 NOTE — REASON FOR VISIT
Assumed patient care at 0700, received bedside report from RN. Orders checked and verified. Infant on continuous monitor. Infant appears comfortable and free from pain. Q3 cares in place and medication given per MAR.      [Post Op: _________] : a [unfilled] post op visit

## 2024-04-17 ENCOUNTER — EMERGENCY (EMERGENCY)
Facility: HOSPITAL | Age: 84
LOS: 0 days | Discharge: HOME | End: 2024-04-17
Attending: EMERGENCY MEDICINE
Payer: MEDICARE

## 2024-04-17 VITALS — WEIGHT: 179.9 LBS | HEIGHT: 69 IN

## 2024-04-17 VITALS
HEART RATE: 53 BPM | TEMPERATURE: 98 F | SYSTOLIC BLOOD PRESSURE: 113 MMHG | DIASTOLIC BLOOD PRESSURE: 66 MMHG | RESPIRATION RATE: 18 BRPM | OXYGEN SATURATION: 93 %

## 2024-04-17 DIAGNOSIS — Z88.1 ALLERGY STATUS TO OTHER ANTIBIOTIC AGENTS STATUS: ICD-10-CM

## 2024-04-17 DIAGNOSIS — I25.10 ATHEROSCLEROTIC HEART DISEASE OF NATIVE CORONARY ARTERY WITHOUT ANGINA PECTORIS: ICD-10-CM

## 2024-04-17 DIAGNOSIS — Z98.890 OTHER SPECIFIED POSTPROCEDURAL STATES: Chronic | ICD-10-CM

## 2024-04-17 DIAGNOSIS — I10 ESSENTIAL (PRIMARY) HYPERTENSION: ICD-10-CM

## 2024-04-17 DIAGNOSIS — Z88.5 ALLERGY STATUS TO NARCOTIC AGENT: ICD-10-CM

## 2024-04-17 DIAGNOSIS — Z90.89 ACQUIRED ABSENCE OF OTHER ORGANS: Chronic | ICD-10-CM

## 2024-04-17 DIAGNOSIS — R07.89 OTHER CHEST PAIN: ICD-10-CM

## 2024-04-17 LAB
ALBUMIN SERPL ELPH-MCNC: 3.9 G/DL — SIGNIFICANT CHANGE UP (ref 3.3–5)
ALP SERPL-CCNC: 68 U/L — SIGNIFICANT CHANGE UP (ref 40–120)
ALT FLD-CCNC: 23 U/L — SIGNIFICANT CHANGE UP (ref 12–78)
ANION GAP SERPL CALC-SCNC: 4 MMOL/L — LOW (ref 5–17)
APTT BLD: 40.5 SEC — HIGH (ref 24.5–35.6)
AST SERPL-CCNC: 24 U/L — SIGNIFICANT CHANGE UP (ref 15–37)
BASOPHILS # BLD AUTO: 0.04 K/UL — SIGNIFICANT CHANGE UP (ref 0–0.2)
BASOPHILS NFR BLD AUTO: 0.7 % — SIGNIFICANT CHANGE UP (ref 0–2)
BILIRUB SERPL-MCNC: 0.7 MG/DL — SIGNIFICANT CHANGE UP (ref 0.2–1.2)
BUN SERPL-MCNC: 25 MG/DL — HIGH (ref 7–23)
CALCIUM SERPL-MCNC: 9.8 MG/DL — SIGNIFICANT CHANGE UP (ref 8.5–10.1)
CHLORIDE SERPL-SCNC: 110 MMOL/L — HIGH (ref 96–108)
CO2 SERPL-SCNC: 26 MMOL/L — SIGNIFICANT CHANGE UP (ref 22–31)
CREAT SERPL-MCNC: 1.26 MG/DL — SIGNIFICANT CHANGE UP (ref 0.5–1.3)
EGFR: 57 ML/MIN/1.73M2 — LOW
EOSINOPHIL # BLD AUTO: 0.25 K/UL — SIGNIFICANT CHANGE UP (ref 0–0.5)
EOSINOPHIL NFR BLD AUTO: 4.5 % — SIGNIFICANT CHANGE UP (ref 0–6)
GLUCOSE SERPL-MCNC: 91 MG/DL — SIGNIFICANT CHANGE UP (ref 70–99)
HCT VFR BLD CALC: 41.7 % — SIGNIFICANT CHANGE UP (ref 39–50)
HGB BLD-MCNC: 13.7 G/DL — SIGNIFICANT CHANGE UP (ref 13–17)
IMM GRANULOCYTES NFR BLD AUTO: 0.4 % — SIGNIFICANT CHANGE UP (ref 0–0.9)
INR BLD: 1.25 RATIO — HIGH (ref 0.85–1.18)
LYMPHOCYTES # BLD AUTO: 0.82 K/UL — LOW (ref 1–3.3)
LYMPHOCYTES # BLD AUTO: 14.8 % — SIGNIFICANT CHANGE UP (ref 13–44)
MAGNESIUM SERPL-MCNC: 2.1 MG/DL — SIGNIFICANT CHANGE UP (ref 1.6–2.6)
MCHC RBC-ENTMCNC: 30.9 PG — SIGNIFICANT CHANGE UP (ref 27–34)
MCHC RBC-ENTMCNC: 32.9 GM/DL — SIGNIFICANT CHANGE UP (ref 32–36)
MCV RBC AUTO: 93.9 FL — SIGNIFICANT CHANGE UP (ref 80–100)
MONOCYTES # BLD AUTO: 0.49 K/UL — SIGNIFICANT CHANGE UP (ref 0–0.9)
MONOCYTES NFR BLD AUTO: 8.8 % — SIGNIFICANT CHANGE UP (ref 2–14)
NEUTROPHILS # BLD AUTO: 3.93 K/UL — SIGNIFICANT CHANGE UP (ref 1.8–7.4)
NEUTROPHILS NFR BLD AUTO: 70.8 % — SIGNIFICANT CHANGE UP (ref 43–77)
NT-PROBNP SERPL-SCNC: 239 PG/ML — SIGNIFICANT CHANGE UP (ref 0–450)
PLATELET # BLD AUTO: 241 K/UL — SIGNIFICANT CHANGE UP (ref 150–400)
POTASSIUM SERPL-MCNC: 4.2 MMOL/L — SIGNIFICANT CHANGE UP (ref 3.5–5.3)
POTASSIUM SERPL-SCNC: 4.2 MMOL/L — SIGNIFICANT CHANGE UP (ref 3.5–5.3)
PROT SERPL-MCNC: 7.3 GM/DL — SIGNIFICANT CHANGE UP (ref 6–8.3)
PROTHROM AB SERPL-ACNC: 14 SEC — HIGH (ref 9.5–13)
RBC # BLD: 4.44 M/UL — SIGNIFICANT CHANGE UP (ref 4.2–5.8)
RBC # FLD: 13.6 % — SIGNIFICANT CHANGE UP (ref 10.3–14.5)
SODIUM SERPL-SCNC: 140 MMOL/L — SIGNIFICANT CHANGE UP (ref 135–145)
TROPONIN I, HIGH SENSITIVITY RESULT: 10.82 NG/L — SIGNIFICANT CHANGE UP
TROPONIN I, HIGH SENSITIVITY RESULT: 9.46 NG/L — SIGNIFICANT CHANGE UP
WBC # BLD: 5.55 K/UL — SIGNIFICANT CHANGE UP (ref 3.8–10.5)
WBC # FLD AUTO: 5.55 K/UL — SIGNIFICANT CHANGE UP (ref 3.8–10.5)

## 2024-04-17 PROCEDURE — 71045 X-RAY EXAM CHEST 1 VIEW: CPT

## 2024-04-17 PROCEDURE — 99285 EMERGENCY DEPT VISIT HI MDM: CPT

## 2024-04-17 PROCEDURE — 80053 COMPREHEN METABOLIC PANEL: CPT

## 2024-04-17 PROCEDURE — 93005 ELECTROCARDIOGRAM TRACING: CPT

## 2024-04-17 PROCEDURE — 83880 ASSAY OF NATRIURETIC PEPTIDE: CPT

## 2024-04-17 PROCEDURE — 71045 X-RAY EXAM CHEST 1 VIEW: CPT | Mod: 26

## 2024-04-17 PROCEDURE — 85025 COMPLETE CBC W/AUTO DIFF WBC: CPT

## 2024-04-17 PROCEDURE — 85610 PROTHROMBIN TIME: CPT

## 2024-04-17 PROCEDURE — 36415 COLL VENOUS BLD VENIPUNCTURE: CPT

## 2024-04-17 PROCEDURE — 93010 ELECTROCARDIOGRAM REPORT: CPT

## 2024-04-17 PROCEDURE — 83735 ASSAY OF MAGNESIUM: CPT

## 2024-04-17 PROCEDURE — 84484 ASSAY OF TROPONIN QUANT: CPT

## 2024-04-17 PROCEDURE — 99285 EMERGENCY DEPT VISIT HI MDM: CPT | Mod: 25

## 2024-04-17 PROCEDURE — 85730 THROMBOPLASTIN TIME PARTIAL: CPT

## 2024-04-17 NOTE — ED ADULT TRIAGE NOTE - CHIEF COMPLAINT QUOTE
Pt ambulatory to ED with c/o chest pain x 6am this morning. Pt endorses SOB. PMH prostate CA. Pt took pantoprazole and baby asprin PTA. allergies clindamycin and morphine. no s/s of acute distress. Spo2 92 RA at triage. STAT EKG

## 2024-04-17 NOTE — ED PROVIDER NOTE - PATIENT PORTAL LINK FT
You can access the FollowMyHealth Patient Portal offered by [facility] by registering at the following website: [email or web address]. By joining [email or web address]’s FollowMyHealth portal, you will also be able to view your health information using other applications (apps) compatible with our system.

## 2024-04-17 NOTE — ED ADULT NURSE NOTE - CINV DISCH TEACH PARTICIP
Sudden numbness or weakness of the face, arm, or leg, especially on one side of the body. Confusion, trouble speaking or understanding. Trouble seeing in one or both eyes. Trouble walking, dizziness, loss of balance or coordination. Severe headache. Patient

## 2024-04-17 NOTE — ED PROVIDER NOTE - NSFOLLOWUPINSTRUCTIONS_ED_ALL_ED_FT
Follow-up with your cardiologist within the next 24 hours.  Return to the emergency department if you have worsening/persistent pain, shortness of breath, dizziness or for other concerns.      Chest Pain    Chest pain can be caused by many different conditions which may or may not be dangerous. Causes include heartburn, lung infections, heart attack, blood clot in lungs, skin infections, strain or damage to muscle, cartilage, or bones, etc. In addition to a history and physical examination, an electrocardiogram (ECG) or other lab tests may have been performed to determine the cause of your chest pain. Follow up with your primary care provider or with a cardiologist as instructed.     SEEK IMMEDIATE MEDICAL CARE IF YOU HAVE ANY OF THE FOLLOWING SYMPTOMS: worsening chest pain, coughing up blood, unexplained back/neck/jaw pain, severe abdominal pain, dizziness or lightheadedness, fainting, shortness of breath, sweaty or clammy skin, vomiting, or racing heart beat. These symptoms may represent a serious problem that is an emergency. Do not wait to see if the symptoms will go away. Get medical help right away. Call 911 and do not drive yourself to the hospital.

## 2024-04-17 NOTE — ED PROVIDER NOTE - CLINICAL SUMMARY MEDICAL DECISION MAKING FREE TEXT BOX
84-year-old male with history of coronary artery disease status post stents, hypertension presents for evaluation of chest pain that woke him at approximately 6 AM in the center of his chest that was initially sharp and then radiated to his bilateral anterior neck that was initially 5 out of 10 but now has resolved.  The patient states that he took 4 chewable aspirin and pantoprazole prior to arrival.  The patient denies any diaphoresis or shortness of breath at the time but notes that he has been worked up recently for dyspnea on exertion and had a negative angiogram within the last 2 months.  Patient was also followed up by pulmonary hypertension specialist and pulmonary hypertension was ruled out based on right heart cath and VQ scan.  Patient follows up with cardiology at [facility].  Currently he is in no apparent distress.   patient has cardiac risk factors to include CAD but recent negative cath  and atypical story for ACS with resolution of pain on arrival to the emergency department.  Will get cardiac workup to include troponin x 2, chest x-ray, EKG and cardiac monitor.  If workup is negative and patient remains asymptomatic in the emergency department, may discharge to follow-up promptly with his cardiologist.

## 2024-04-17 NOTE — ED ADULT NURSE NOTE - OBJECTIVE STATEMENT
Pt presents to the ED c/o chest pain. Pt reports sleeping when sharp chest pain woke him up, radiating to his jaw. Pt reports chest pain has subsided. Pt has a hx of 2 stents placed in 2008 and 2014. Pt denies SOB, dizziness, nausea, vomiting, and fever at this time. Pt is on blood thinner, Eliquis.

## 2024-04-17 NOTE — ED PROVIDER NOTE - OBJECTIVE STATEMENT
84-year-old male with history of coronary artery disease status post stents, hypertension presents for evaluation of chest pain that woke him at approximately 6 AM in the center of his chest that was initially sharp and then radiated to his bilateral anterior neck that was initially 5 out of 10 but now has resolved.  The patient states that he took 4 chewable aspirin and pantoprazole prior to arrival.  The patient denies any diaphoresis or shortness of breath at the time but notes that he has been worked up recently for dyspnea on exertion and had a negative angiogram within the last 2 months.  Patient was also followed up by pulmonary hypertension specialist and pulmonary hypertension was ruled out based on right heart cath and VQ scan.  Patient follows up with cardiology at [facility].  Currently he is in no apparent distress.

## 2025-02-14 ENCOUNTER — EMERGENCY (EMERGENCY)
Facility: HOSPITAL | Age: 85
LOS: 0 days | Discharge: HOME | End: 2025-02-15
Attending: STUDENT IN AN ORGANIZED HEALTH CARE EDUCATION/TRAINING PROGRAM
Payer: MEDICARE

## 2025-02-14 VITALS — HEIGHT: 69 IN

## 2025-02-14 DIAGNOSIS — Z95.5 PRESENCE OF CORONARY ANGIOPLASTY IMPLANT AND GRAFT: ICD-10-CM

## 2025-02-14 DIAGNOSIS — R11.2 NAUSEA WITH VOMITING, UNSPECIFIED: ICD-10-CM

## 2025-02-14 DIAGNOSIS — Z98.890 OTHER SPECIFIED POSTPROCEDURAL STATES: Chronic | ICD-10-CM

## 2025-02-14 DIAGNOSIS — I10 ESSENTIAL (PRIMARY) HYPERTENSION: ICD-10-CM

## 2025-02-14 DIAGNOSIS — I25.10 ATHEROSCLEROTIC HEART DISEASE OF NATIVE CORONARY ARTERY WITHOUT ANGINA PECTORIS: ICD-10-CM

## 2025-02-14 DIAGNOSIS — K65.4 SCLEROSING MESENTERITIS: ICD-10-CM

## 2025-02-14 DIAGNOSIS — Z88.1 ALLERGY STATUS TO OTHER ANTIBIOTIC AGENTS: ICD-10-CM

## 2025-02-14 DIAGNOSIS — K52.9 NONINFECTIVE GASTROENTERITIS AND COLITIS, UNSPECIFIED: ICD-10-CM

## 2025-02-14 DIAGNOSIS — Z88.5 ALLERGY STATUS TO NARCOTIC AGENT: ICD-10-CM

## 2025-02-14 DIAGNOSIS — Z90.89 ACQUIRED ABSENCE OF OTHER ORGANS: Chronic | ICD-10-CM

## 2025-02-14 LAB
ALBUMIN SERPL ELPH-MCNC: 3.4 G/DL — SIGNIFICANT CHANGE UP (ref 3.3–5)
ALP SERPL-CCNC: 74 U/L — SIGNIFICANT CHANGE UP (ref 40–120)
ALT FLD-CCNC: 37 U/L — SIGNIFICANT CHANGE UP (ref 12–78)
ANION GAP SERPL CALC-SCNC: 6 MMOL/L — SIGNIFICANT CHANGE UP (ref 5–17)
APTT BLD: 32.3 SEC — SIGNIFICANT CHANGE UP (ref 24.5–35.6)
AST SERPL-CCNC: 23 U/L — SIGNIFICANT CHANGE UP (ref 15–37)
BASOPHILS # BLD AUTO: 0.05 K/UL — SIGNIFICANT CHANGE UP (ref 0–0.2)
BASOPHILS NFR BLD AUTO: 0.3 % — SIGNIFICANT CHANGE UP (ref 0–2)
BILIRUB SERPL-MCNC: 0.4 MG/DL — SIGNIFICANT CHANGE UP (ref 0.2–1.2)
BUN SERPL-MCNC: 20 MG/DL — SIGNIFICANT CHANGE UP (ref 7–23)
CALCIUM SERPL-MCNC: 9.2 MG/DL — SIGNIFICANT CHANGE UP (ref 8.5–10.1)
CHLORIDE SERPL-SCNC: 109 MMOL/L — HIGH (ref 96–108)
CO2 SERPL-SCNC: 26 MMOL/L — SIGNIFICANT CHANGE UP (ref 22–31)
CREAT SERPL-MCNC: 1.28 MG/DL — SIGNIFICANT CHANGE UP (ref 0.5–1.3)
EGFR: 56 ML/MIN/1.73M2 — LOW
EOSINOPHIL # BLD AUTO: 0.37 K/UL — SIGNIFICANT CHANGE UP (ref 0–0.5)
EOSINOPHIL NFR BLD AUTO: 2.6 % — SIGNIFICANT CHANGE UP (ref 0–6)
GLUCOSE SERPL-MCNC: 107 MG/DL — HIGH (ref 70–99)
HCT VFR BLD CALC: 40.8 % — SIGNIFICANT CHANGE UP (ref 39–50)
HGB BLD-MCNC: 12.7 G/DL — LOW (ref 13–17)
IMM GRANULOCYTES # BLD AUTO: 0.11 K/UL — HIGH (ref 0–0.07)
IMM GRANULOCYTES NFR BLD AUTO: 0.8 % — SIGNIFICANT CHANGE UP (ref 0–0.9)
INR BLD: 0.95 RATIO — SIGNIFICANT CHANGE UP (ref 0.85–1.16)
LACTATE SERPL-SCNC: 1 MMOL/L — SIGNIFICANT CHANGE UP (ref 0.7–2)
LYMPHOCYTES # BLD AUTO: 0.89 K/UL — LOW (ref 1–3.3)
LYMPHOCYTES NFR BLD AUTO: 6.1 % — LOW (ref 13–44)
MCHC RBC-ENTMCNC: 26.1 PG — LOW (ref 27–34)
MCHC RBC-ENTMCNC: 31.1 G/DL — LOW (ref 32–36)
MCV RBC AUTO: 83.8 FL — SIGNIFICANT CHANGE UP (ref 80–100)
MONOCYTES # BLD AUTO: 0.5 K/UL — SIGNIFICANT CHANGE UP (ref 0–0.9)
MONOCYTES NFR BLD AUTO: 3.4 % — SIGNIFICANT CHANGE UP (ref 2–14)
NEUTROPHILS # BLD AUTO: 12.58 K/UL — HIGH (ref 1.8–7.4)
NEUTROPHILS NFR BLD AUTO: 86.8 % — HIGH (ref 43–77)
NRBC # BLD AUTO: 0 K/UL — SIGNIFICANT CHANGE UP (ref 0–0)
NRBC # FLD: 0 K/UL — SIGNIFICANT CHANGE UP (ref 0–0)
NRBC BLD AUTO-RTO: 0 /100 WBCS — SIGNIFICANT CHANGE UP (ref 0–0)
PLATELET # BLD AUTO: 361 K/UL — SIGNIFICANT CHANGE UP (ref 150–400)
PMV BLD: 9.4 FL — SIGNIFICANT CHANGE UP (ref 7–13)
POTASSIUM SERPL-MCNC: 4 MMOL/L — SIGNIFICANT CHANGE UP (ref 3.5–5.3)
POTASSIUM SERPL-SCNC: 4 MMOL/L — SIGNIFICANT CHANGE UP (ref 3.5–5.3)
PROT SERPL-MCNC: 7 GM/DL — SIGNIFICANT CHANGE UP (ref 6–8.3)
PROTHROM AB SERPL-ACNC: 11.2 SEC — SIGNIFICANT CHANGE UP (ref 9.9–13.4)
RBC # BLD: 4.87 M/UL — SIGNIFICANT CHANGE UP (ref 4.2–5.8)
RBC # FLD: 17.2 % — HIGH (ref 10.3–14.5)
SODIUM SERPL-SCNC: 141 MMOL/L — SIGNIFICANT CHANGE UP (ref 135–145)
WBC # BLD: 14.5 K/UL — HIGH (ref 3.8–10.5)
WBC # FLD AUTO: 14.5 K/UL — HIGH (ref 3.8–10.5)

## 2025-02-14 PROCEDURE — 36415 COLL VENOUS BLD VENIPUNCTURE: CPT

## 2025-02-14 PROCEDURE — 85730 THROMBOPLASTIN TIME PARTIAL: CPT

## 2025-02-14 PROCEDURE — 81003 URINALYSIS AUTO W/O SCOPE: CPT

## 2025-02-14 PROCEDURE — 99285 EMERGENCY DEPT VISIT HI MDM: CPT | Mod: 25

## 2025-02-14 PROCEDURE — 99285 EMERGENCY DEPT VISIT HI MDM: CPT

## 2025-02-14 PROCEDURE — 83605 ASSAY OF LACTIC ACID: CPT

## 2025-02-14 PROCEDURE — 80053 COMPREHEN METABOLIC PANEL: CPT

## 2025-02-14 PROCEDURE — 93010 ELECTROCARDIOGRAM REPORT: CPT

## 2025-02-14 PROCEDURE — 74174 CTA ABD&PLVS W/CONTRAST: CPT

## 2025-02-14 PROCEDURE — 85610 PROTHROMBIN TIME: CPT

## 2025-02-14 PROCEDURE — 93005 ELECTROCARDIOGRAM TRACING: CPT

## 2025-02-14 PROCEDURE — 74174 CTA ABD&PLVS W/CONTRAST: CPT | Mod: 26

## 2025-02-14 PROCEDURE — 96375 TX/PRO/DX INJ NEW DRUG ADDON: CPT | Mod: XU

## 2025-02-14 PROCEDURE — 96374 THER/PROPH/DIAG INJ IV PUSH: CPT | Mod: XU

## 2025-02-14 PROCEDURE — 85025 COMPLETE CBC W/AUTO DIFF WBC: CPT

## 2025-02-14 PROCEDURE — 87040 BLOOD CULTURE FOR BACTERIA: CPT | Mod: 91

## 2025-02-14 RX ORDER — ACETAMINOPHEN 160 MG/5ML
1000 SUSPENSION ORAL ONCE
Refills: 0 | Status: COMPLETED | OUTPATIENT
Start: 2025-02-14 | End: 2025-02-14

## 2025-02-14 RX ORDER — BACTERIOSTATIC SODIUM CHLORIDE 0.9 %
1000 VIAL (ML) INJECTION ONCE
Refills: 0 | Status: COMPLETED | OUTPATIENT
Start: 2025-02-14 | End: 2025-02-14

## 2025-02-14 RX ADMIN — ACETAMINOPHEN 400 MILLIGRAM(S): 160 SUSPENSION ORAL at 23:55

## 2025-02-14 RX ADMIN — Medication 1000 MILLILITER(S): at 22:53

## 2025-02-14 NOTE — ED ADULT NURSE NOTE - OBJECTIVE STATEMENT
Pt presents to the ED c/o abdominal pain. Pt reports abdominal pain x 3 weeks, worsening pain over the past 5 days. Pt reports episode of vomiting and diarrhea starting tonight. Pt reports taking valium and dicyclomine around 7PM, without relief.

## 2025-02-14 NOTE — ED ADULT TRIAGE NOTE - CHIEF COMPLAINT QUOTE
Ambulatory to ER with c/o abdominal  pain x 5 days; vomiting and diarrhaea starting today. Patient took valium and dicyclomine with no relief.

## 2025-02-14 NOTE — ED ADULT NURSE NOTE - NSFALLHARMRISKINTERV_ED_ALL_ED

## 2025-02-14 NOTE — ED PROVIDER NOTE - CLINICAL SUMMARY MEDICAL DECISION MAKING FREE TEXT BOX
84 y/o with hx of CAD w/ stents, prostate ca s/p RT p/w acute on chronic abdominal pain. Patient has seen GI Dr. Wooten and diagnosed with IBD. Patient describes post-prandial periumbilical pain assoc with nausea/vomiting. No fevers. Having bowel movements. ROS otherwise neg.     Prior outpatient imaging revealing:  Flexible Sigmoidoscopy 7/17/24: showing erythematous mucosa in the distal retum w/ nonbleeding internal hemorrhoids  CT Abdomen w/ IV 11/19/24 showing mild wall thickening and bowel wall hyperemia within the terminal ileum w/ inflammatory change, hiatal hernia, gallstones, diverticulosis.   Colonoscopy 12/10/24 showing a single ulcer at the appendiceal orifice.   CT Abdomen 2/12/25 showing interval resolution inflammatory changes to the terminal ileum.    On exam, no apparent distress, ab soft, mildly distended with no rebound guarding     c/f chronic mesenteric ischemia  labs, fluids, CTA abdomen, reassess

## 2025-02-14 NOTE — ED STATDOCS - PROGRESS NOTE DETAILS
84-year-old male with history of coronary artery disease status post stents, hypertension, Presents with chief complaint of acute on chronic abdominal pain.  Pain has been ongoing for several months, worsening with eating.  Pain is located in the lower abdomen.  Patient has had multiple CT scans as an outpatient, one of them showed a terminal ileitis.  He has had colonoscopy, endoscopy.  Per family member there is some concern about a chronic mesenteric ischemia.  Patient presents tonight with acute worsening of his pain.  He is hypotensive and triage to 96/57, and normally has a history of hypertension.  Sending to [facility] for further evaluation management. Abhijeet Hollis D.O.

## 2025-02-14 NOTE — ED PROVIDER NOTE - PHYSICAL EXAMINATION
Constitutional: NAD, alert, verbal  Cardiac: RRR no MRG  Resp: clear, no wheezing or crackles  GI: ab soft ntnd, minimally distended, no r/g  Neuro: CLAROS

## 2025-02-14 NOTE — ED PROVIDER NOTE - NSDCPRINTRESULTS_ED_ALL_ED
October 3, 2017      Dinora Lew  [address]  [place] [address]        Dear Dinora Lew,    My records indicate that you have not completed the lab work that was recently ordered on 2017. These tests are very important in the management of your healthcare.      The tests have been re-ordered and can be completed at your convenience without an appointment. You should fast for 12 hours beforehand, drinking only water during that time and taking any prescribed medications. If you drink alcoholic beverages, you must refrain for 24 hours before arriving at the lab.      The lab is open Monday through Friday, 8:00 a.m. to 5:00 p.m.  If you have any questions or concerns, please do not hesitate to contact me during normal business office hours. If the orders  again, I will assume that you no longer wish to have these done.    Sincerely,        Janice Chavez MD  [facility]  [address]  [place] [facility] [place] [address] [phone]                     Patient requests all Lab, Cardiology, and Radiology Results on their Discharge Instructions

## 2025-02-14 NOTE — ED PROVIDER NOTE - NSFOLLOWUPINSTRUCTIONS_ED_ALL_ED_FT
A referral for Gastroenterology has been placed. You will be contacted regarding the referral. A referral for Gastroenterology has been placed. You will be contacted regarding the referral.    Your CT was a preliminary report. If there are any major discrepancies you will be notified.     Return to the Emergency Department for worsening or persistent symptoms, and/or ANY NEW OR CONCERNING SYMPTOMS. If you have issues obtaining follow up, please call: [phone] or [phone]  to obtain a doctor or specialist who takes your insurance in your area.

## 2025-02-14 NOTE — ED PROVIDER NOTE - PATIENT PORTAL LINK FT
You can access the FollowMyHealth Patient Portal offered by [facility] by registering at the following website: [email or web address]. By joining [employer]’s FollowMyHealth portal, you will also be able to view your health information using other applications (apps) compatible with our system.

## 2025-02-15 VITALS
RESPIRATION RATE: 16 BRPM | OXYGEN SATURATION: 93 % | TEMPERATURE: 99 F | HEART RATE: 59 BPM | SYSTOLIC BLOOD PRESSURE: 102 MMHG | DIASTOLIC BLOOD PRESSURE: 59 MMHG

## 2025-02-15 LAB
APPEARANCE UR: CLEAR — SIGNIFICANT CHANGE UP
BILIRUB UR-MCNC: NEGATIVE — SIGNIFICANT CHANGE UP
COLOR SPEC: YELLOW — SIGNIFICANT CHANGE UP
DIFF PNL FLD: NEGATIVE — SIGNIFICANT CHANGE UP
GLUCOSE UR QL: NEGATIVE MG/DL — SIGNIFICANT CHANGE UP
KETONES UR-MCNC: NEGATIVE MG/DL — SIGNIFICANT CHANGE UP
LEUKOCYTE ESTERASE UR-ACNC: NEGATIVE — SIGNIFICANT CHANGE UP
NITRITE UR-MCNC: NEGATIVE — SIGNIFICANT CHANGE UP
PH UR: 6 — SIGNIFICANT CHANGE UP (ref 5–8)
PROT UR-MCNC: NEGATIVE MG/DL — SIGNIFICANT CHANGE UP
SP GR SPEC: >1.03 — HIGH (ref 1–1.03)
UROBILINOGEN FLD QL: 0.2 MG/DL — SIGNIFICANT CHANGE UP (ref 0.2–1)

## 2025-02-15 RX ORDER — FAMOTIDINE 10 MG/ML
20 INJECTION INTRAVENOUS ONCE
Refills: 0 | Status: COMPLETED | OUTPATIENT
Start: 2025-02-15 | End: 2025-02-15

## 2025-02-15 RX ORDER — KETOROLAC TROMETHAMINE 10 MG
15 TABLET ORAL ONCE
Refills: 0 | Status: DISCONTINUED | OUTPATIENT
Start: 2025-02-15 | End: 2025-02-15

## 2025-02-15 RX ADMIN — FAMOTIDINE 20 MILLIGRAM(S): 10 INJECTION INTRAVENOUS at 00:26

## 2025-02-15 RX ADMIN — Medication 15 MILLIGRAM(S): at 01:33

## 2025-03-27 ENCOUNTER — APPOINTMENT (OUTPATIENT)
Dept: SPINE | Facility: CLINIC | Age: 85
End: 2025-03-27